# Patient Record
Sex: FEMALE | Race: WHITE | URBAN - METROPOLITAN AREA
[De-identification: names, ages, dates, MRNs, and addresses within clinical notes are randomized per-mention and may not be internally consistent; named-entity substitution may affect disease eponyms.]

---

## 2020-07-07 ENCOUNTER — OFFICE VISIT (OUTPATIENT)
Dept: URBAN - METROPOLITAN AREA CLINIC 113 | Facility: CLINIC | Age: 70
End: 2020-07-07

## 2020-07-23 ENCOUNTER — OFFICE VISIT (OUTPATIENT)
Dept: URBAN - METROPOLITAN AREA CLINIC 113 | Facility: CLINIC | Age: 70
End: 2020-07-23

## 2020-07-25 ENCOUNTER — TELEPHONE ENCOUNTER (OUTPATIENT)
Dept: URBAN - METROPOLITAN AREA CLINIC 13 | Facility: CLINIC | Age: 70
End: 2020-07-25

## 2020-07-25 RX ORDER — SENNOSIDES 8.6 MG
TAKE 1 CAPSULE DAILY WITH A MEAL TABLET ORAL
Refills: 0 | OUTPATIENT
End: 2017-03-01

## 2020-07-25 RX ORDER — DESOXIMETASONE 2.5 MG/G
APPLY INCH CREAM TOPICAL
Refills: 0 | OUTPATIENT
End: 2018-06-12

## 2020-07-25 RX ORDER — MUPIROCIN 20 MG/G
APPLY A SMALL AMOUNT 3 TIMES DAILY AS DIRECTED OINTMENT TOPICAL
Refills: 0 | OUTPATIENT
End: 2019-12-19

## 2020-07-25 RX ORDER — SUMATRIPTAN SUCCINATE 6 MG/.5ML
INJECTION, SOLUTION SUBCUTANEOUS
Qty: 2 | Refills: 0 | OUTPATIENT
Start: 2017-05-04 | End: 2019-03-07

## 2020-07-25 RX ORDER — HYDROCODONE POLISTIREX AND CHLORPHENIRAMINE POLISTIREX 10; 8 MG/5ML; MG/5ML
SUSPENSION, EXTENDED RELEASE ORAL
Qty: 60 | Refills: 0 | OUTPATIENT
Start: 2017-02-13 | End: 2018-02-02

## 2020-07-25 RX ORDER — LIDOCAINE 50 MG/G
APPLY 1 PATCH TO THE AFFECTED AREA AND LEAVE IN PLACE FOR 12 HOURS, THEN REMOVE AND LEAVE OFF FOR 12 HOURS PATCH TOPICAL
Refills: 0 | OUTPATIENT
Start: 2015-09-12 | End: 2017-03-01

## 2020-07-25 RX ORDER — METRONIDAZOLE 500 MG/1
TAKE 1 TABLET EVERY 6 HOURS DAILY TABLET ORAL
Qty: 56 | Refills: 0 | OUTPATIENT
Start: 2020-01-06 | End: 2020-01-23

## 2020-07-25 RX ORDER — COLESEVELAM HYDROCHLORIDE 625 MG/1
TAKE 3 TABLET TWICE DAILY TABLET, FILM COATED ORAL
Qty: 180 | Refills: 2 | OUTPATIENT
Start: 2015-02-09 | End: 2017-10-31

## 2020-07-25 RX ORDER — DIPHENOXYLATE HCL/ATROPINE 2.5-.025MG
TAKE 1 TABLET 4 TIMES DAILY PRN DIARRHEA TABLET ORAL
Qty: 80 | Refills: 1 | OUTPATIENT
End: 2017-03-01

## 2020-07-25 RX ORDER — NAPROXEN SODIUM 220 MG
USE AS DIRECTED TABLET ORAL
Refills: 0 | OUTPATIENT
End: 2020-02-13

## 2020-07-25 RX ORDER — GEMFIBROZIL 600 MG/1
TAKE 1 TABLET TWICE DAILY TABLET, FILM COATED ORAL
Refills: 0 | OUTPATIENT
Start: 2015-02-02 | End: 2017-09-14

## 2020-07-25 RX ORDER — SUMATRIPTAN SUCCINATE 100 MG
TAKE 1 TABLET AT ONSET OF MIGRAINE HEADACHE.  MAY REPEAT IN 2 HOURS IF NEEDED TABLET ORAL
Refills: 0 | OUTPATIENT
End: 2020-02-13

## 2020-07-25 RX ORDER — DIPHENOXYLATE HCL/ATROPINE 2.5-.025MG
TAKE 1 TABLET EVERY 1 TO 2 HOURS AS NEEDED FOR DIARRHEA. MAXIMUM 8 TABLETS PER DAY TABLET ORAL
Refills: 0 | OUTPATIENT
End: 2017-10-31

## 2020-07-25 RX ORDER — GEMFIBROZIL 600 MG/1
TAKE 1 TABLET DAILY TABLET, FILM COATED ORAL
Refills: 0 | OUTPATIENT
End: 2017-10-31

## 2020-07-25 RX ORDER — PANCRELIPASE 36000; 180000; 114000 [USP'U]/1; [USP'U]/1; [USP'U]/1
TAKE 2 CAPSULE BEFORE MEALS CAPSULE, DELAYED RELEASE PELLETS ORAL
Qty: 180 | Refills: 6 | OUTPATIENT
Start: 2016-08-31 | End: 2017-10-31

## 2020-07-25 RX ORDER — ONDANSETRON 8 MG/1
TAKE 1 TABLET TWICE DAILY PRN TABLET ORAL
Qty: 14 | Refills: 0 | OUTPATIENT
Start: 2015-06-16 | End: 2017-03-01

## 2020-07-25 RX ORDER — SACCHAROMYCES BOULARDII 250 MG
TAKE 1 CAPSULE DAILY CAPSULE ORAL
Refills: 0 | OUTPATIENT
End: 2018-02-02

## 2020-07-25 RX ORDER — TRIAMCINOLONE ACETONIDE 1 MG/G
APPLY  AND RUB  IN A THIN FILM TO AFFECTED AREAS TWICE DAILY.(AM AND PM) CREAM TOPICAL
Refills: 0 | OUTPATIENT
End: 2016-06-15

## 2020-07-25 RX ORDER — METHENAMINE HIPPURATE 1 G/1
TAKE 1 TABLET DAILY IN THE EVENING TABLET ORAL
Refills: 0 | OUTPATIENT
End: 2019-05-21

## 2020-07-25 RX ORDER — SERTRALINE 100 MG/1
TAKE 1 TABLET TWICE DAILY TABLET, FILM COATED ORAL
Refills: 0 | OUTPATIENT
Start: 2015-02-02 | End: 2020-04-10

## 2020-07-25 RX ORDER — MIRABEGRON 25 MG/1
TAKE 1 TABLET DAILY TABLET, FILM COATED, EXTENDED RELEASE ORAL
Refills: 0 | OUTPATIENT
End: 2017-03-01

## 2020-07-25 RX ORDER — TRIAMCINOLONE ACETONIDE 1 MG/G
APPLY SPARINGLY TO AFFECTED AREA(S) 2 TO 3 TIMES DAILY CREAM TOPICAL
Refills: 0 | OUTPATIENT
End: 2018-02-02

## 2020-07-25 RX ORDER — EZETIMIBE 10 MG/1
TAKE 1 TABLET DAILY TABLET ORAL
Refills: 0 | OUTPATIENT
End: 2017-01-05

## 2020-07-25 RX ORDER — CALCIUM CARBONATE/VITAMIN D3 500-10/5ML
TAKE 1 CAPSULE DAILY LIQUID (ML) ORAL
Refills: 0 | OUTPATIENT
End: 2017-03-01

## 2020-07-25 RX ORDER — CELECOXIB 200 MG/1
TAKE 1 CAPSULE DAILY WITH A MEAL CAPSULE ORAL
Refills: 0 | OUTPATIENT
Start: 2015-10-25 | End: 2017-10-31

## 2020-07-25 RX ORDER — MOMETASONE FUROATE 1 MG/G
APPLY SPARINGLY TO AFFECTED AREAS TWICE DAILY.(AM AND PM) CREAM TOPICAL
Qty: 1 | Refills: 1 | OUTPATIENT
Start: 2017-01-05 | End: 2017-03-01

## 2020-07-25 RX ORDER — CHOLESTYRAMINE 4 G/9G
MIX THE CONTENTS OF 1 POWDER PACKET WITH 2-6 OZ OF NONCARBONATED BEVERAGE AND SWALLOW TWICE DAILY POWDER, FOR SUSPENSION ORAL
Qty: 60 | Refills: 5 | OUTPATIENT
Start: 2019-06-17 | End: 2019-12-19

## 2020-07-25 RX ORDER — IMMUNE GLOB/PLASMA FRA BOVINE 5 G
TAKE 1 PACKET DAILY POWDER IN PACKET (EA) ORAL
Refills: 0 | OUTPATIENT
End: 2017-10-31

## 2020-07-25 RX ORDER — IMMUNE GLOB/PLASMA FRA BOVINE 5 G
TAKE 1 PACKET TWICE DAILY FOR 2 WEEKS, THEN DECREASE TO ONE PACKET DAILY POWDER IN PACKET (EA) ORAL
Qty: 60 | Refills: 5 | OUTPATIENT
Start: 2016-06-15 | End: 2016-09-15

## 2020-07-25 RX ORDER — METHENAMINE HIPPURATE 1 G/1
TAKE 1 TABLET TWICE DAILY TABLET ORAL
Refills: 0 | OUTPATIENT
End: 2020-04-10

## 2020-07-25 RX ORDER — SULFAMETHOXAZOLE AND TRIMETHOPRIM 800; 160 MG/1; MG/1
TABLET ORAL
Qty: 20 | Refills: 0 | OUTPATIENT
Start: 2017-10-23 | End: 2018-02-02

## 2020-07-25 RX ORDER — DICYCLOMINE HYDROCHLORIDE 10 MG/1
TAKE 1 CAPSULE AS NEEDED FOR ABDOMINAL PAIN UP TO 4 TIMES DAILY CAPSULE ORAL
Qty: 60 | Refills: 6 | OUTPATIENT
Start: 2016-03-14 | End: 2017-03-01

## 2020-07-25 RX ORDER — TOPIRAMATE 50 MG/1
TAKE 1 CAPSULE BEDTIME CAPSULE, EXTENDED RELEASE ORAL
Refills: 0 | OUTPATIENT
End: 2020-04-10

## 2020-07-25 RX ORDER — METRONIDAZOLE 500 MG/1
TAKE 1 TABLET 3 TIMES A DAY TABLET ORAL
Qty: 15 | Refills: 0 | OUTPATIENT
Start: 2016-06-28 | End: 2016-09-15

## 2020-07-25 RX ORDER — CIPROFLOXACIN HYDROCHLORIDE 500 MG/1
TAKE 1 TABLET TWICE DAILY TABLET, FILM COATED ORAL
Qty: 28 | Refills: 0 | OUTPATIENT
Start: 2020-01-06 | End: 2020-01-23

## 2020-07-25 RX ORDER — BIFIDOBACTERIUM LONGUM 10MM CELL
TAKE 1 CAPSULE DAILY CAPSULE ORAL
Refills: 0 | OUTPATIENT
End: 2016-09-15

## 2020-07-25 RX ORDER — DICLOFENAC SODIUM 100 MG/1
TABLET, EXTENDED RELEASE ORAL
Qty: 30 | Refills: 0 | OUTPATIENT
Start: 2017-07-10 | End: 2017-09-14

## 2020-07-25 RX ORDER — RIFAXIMIN 550 MG/1
TAKE 1 TABLET 3 TIMES DAILY TABLET ORAL
Qty: 42 | Refills: 2 | OUTPATIENT
Start: 2019-05-21 | End: 2019-12-19

## 2020-07-25 RX ORDER — GABAPENTIN 800 MG/1
TAKE 1 TABLET 3 TIMES DAILY TABLET, FILM COATED ORAL
Refills: 0 | OUTPATIENT
Start: 2015-02-18 | End: 2017-10-31

## 2020-07-25 RX ORDER — PRENATAL VIT 49/IRON FUM/FOLIC 6.75-0.2MG
TAKE 1 TABLET DAILY TABLET ORAL
Refills: 0 | OUTPATIENT
End: 2020-04-13

## 2020-07-25 RX ORDER — ALBUTEROL SULFATE 90 UG/1
INHALE 1 TO 2 PUFFS EVERY 4 TO 6 HOURS AS NEEDED AEROSOL, METERED RESPIRATORY (INHALATION)
Refills: 0 | OUTPATIENT
End: 2018-02-02

## 2020-07-25 RX ORDER — MONTELUKAST SODIUM 10 MG/1
TAKE 1 TABLET AT BEDTIME TABLET, FILM COATED ORAL
Refills: 0 | OUTPATIENT
End: 2017-10-31

## 2020-07-25 RX ORDER — MOMETASONE FUROATE 1 MG/G
APPLY 0.75 GM TWICE DAILY PRN CREAM TOPICAL
Qty: 45 | Refills: 0 | OUTPATIENT
Start: 2017-10-31 | End: 2018-02-02

## 2020-07-25 RX ORDER — POLYETHYLENE GLYCOL 3350, SODIUM CHLORIDE, SODIUM BICARBONATE AND POTASSIUM CHLORIDE WITH LEMON FLAVOR 420; 11.2; 5.72; 1.48 G/4L; G/4L; G/4L; G/4L
TAKE 1/2 GALLON AT 5:00 PM DAY BEFORE PROCEDURE, TAKE SECOND 1/2 OF GALLON 6 HRS PRIOR TO PROCEDURE POWDER, FOR SOLUTION ORAL
Qty: 1 | Refills: 0 | OUTPATIENT
Start: 2020-04-14 | End: 2020-04-28

## 2020-07-25 RX ORDER — CALCIUM CARB/VIT D2/MINERALS
TAKE 1 TABLET DAILY - PT STATES UNKNOWN DOSE TABLET ORAL
Refills: 0 | OUTPATIENT
End: 2017-03-01

## 2020-07-25 RX ORDER — POLYETHYLENE GLYCOL 3350, SODIUM CHLORIDE, SODIUM BICARBONATE AND POTASSIUM CHLORIDE WITH LEMON FLAVOR 420; 11.2; 5.72; 1.48 G/4L; G/4L; G/4L; G/4L
DRINK 32 OUNCES AT 5PM DAY BEFORE PROCEDURE AND 6 HOURS PRIOR POWDER, FOR SOLUTION ORAL
Qty: 1 | Refills: 0 | OUTPATIENT
Start: 2016-02-04 | End: 2016-02-11

## 2020-07-25 RX ORDER — MECLIZINE HYDROCHLORIDE 12.5 MG/1
TAKE 1-2 TABLETS TWICE DAILY TABLET ORAL
Refills: 0 | OUTPATIENT
End: 2017-10-31

## 2020-07-25 RX ORDER — VITAM B12 100 MCG
TAKE 1 TABLET DAILY AS DIRECTED TAB ORAL
Refills: 0 | OUTPATIENT
End: 2016-02-11

## 2020-07-25 RX ORDER — OMEPRAZOLE 40 MG/1
TAKE 1 CAPSULE BY MOUTH 30 MINUTES BEFORE BREAKFAST CAPSULE, DELAYED RELEASE ORAL
Qty: 30 | Refills: 11 | OUTPATIENT
Start: 2016-02-02 | End: 2017-03-01

## 2020-07-25 RX ORDER — THIAMINE MONONITRATE (VIT B1) 100 MG
TAKE 1 TABLET DAILY TABLET ORAL
Refills: 0 | OUTPATIENT
End: 2017-10-31

## 2020-07-25 RX ORDER — TOPIRAMATE 50 MG/1
TAKE 1 TABLET DAILY TABLET, FILM COATED ORAL
Refills: 0 | OUTPATIENT
End: 2018-02-02

## 2020-07-25 RX ORDER — DICYCLOMINE HYDROCHLORIDE 10 MG/1
TAKE 1 CAPSULE EVERY 6 HOURS PRN ABDOMINAL PAIN CAPSULE ORAL
Qty: 40 | Refills: 1 | OUTPATIENT
Start: 2018-06-12 | End: 2019-03-07

## 2020-07-25 RX ORDER — LEVOTHYROXINE SODIUM 125 UG/1
TAKE 1 TABLET DAILY TABLET ORAL
Refills: 0 | OUTPATIENT
Start: 2015-07-13 | End: 2017-01-05

## 2020-07-25 RX ORDER — TAMSULOSIN HYDROCHLORIDE 0.4 MG/1
CAPSULE ORAL
Qty: 30 | Refills: 0 | OUTPATIENT
Start: 2020-01-10 | End: 2020-02-13

## 2020-07-25 RX ORDER — FAMOTIDINE 40 MG/1
TAKE 1 TABLET BEDTIME TABLET ORAL
Qty: 30 | Refills: 11 | OUTPATIENT
Start: 2015-03-09 | End: 2017-03-01

## 2020-07-26 ENCOUNTER — TELEPHONE ENCOUNTER (OUTPATIENT)
Dept: URBAN - METROPOLITAN AREA CLINIC 13 | Facility: CLINIC | Age: 70
End: 2020-07-26

## 2020-07-26 RX ORDER — HYDROCODONE BITARTRATE AND ACETAMINOPHEN 7.5; 325 MG/1; MG/1
TABLET ORAL
Qty: 15 | Refills: 0 | Status: ACTIVE | COMMUNITY
Start: 2016-06-29

## 2020-07-26 RX ORDER — SUMATRIPTAN SUCCINATE 50 MG/1
TABLET, FILM COATED ORAL
Qty: 9 | Refills: 0 | Status: ACTIVE | COMMUNITY
Start: 2016-02-22

## 2020-07-26 RX ORDER — NITROFURANTOIN MONOHYDRATE/MACROCRYSTALLINE 25; 75 MG/1; MG/1
CAPSULE ORAL
Qty: 14 | Refills: 0 | Status: ACTIVE | COMMUNITY
Start: 2016-07-26

## 2020-07-26 RX ORDER — ACETAMINOPHEN 500 MG/1
TAKE 2 TABLET TWICE DAILY TABLET, FILM COATED ORAL
Refills: 0 | Status: ACTIVE | COMMUNITY

## 2020-07-26 RX ORDER — CIPROFLOXACIN HYDROCHLORIDE 250 MG/1
TABLET, FILM COATED ORAL
Qty: 14 | Refills: 0 | Status: ACTIVE | COMMUNITY
Start: 2016-08-02

## 2020-07-26 RX ORDER — CLARITHROMYCIN 500 MG/1
TABLET, FILM COATED ORAL
Qty: 20 | Refills: 0 | Status: ACTIVE | COMMUNITY
Start: 2018-03-14

## 2020-07-26 RX ORDER — FEXOFENADINE HYDROCHLORIDE 180 MG/1
TAKE 1 TABLET EVERY MORNING TABLET ORAL
Qty: 30 | Refills: 0 | Status: ACTIVE | COMMUNITY
Start: 2018-10-12

## 2020-07-26 RX ORDER — MONTELUKAST SODIUM 10 MG/1
TABLET, FILM COATED ORAL
Qty: 30 | Refills: 0 | Status: ACTIVE | COMMUNITY
Start: 2017-02-13

## 2020-07-26 RX ORDER — SUMATRIPTAN SUCCINATE 50 MG/1
TABLET, FILM COATED ORAL
Qty: 9 | Refills: 0 | Status: ACTIVE | COMMUNITY
Start: 2016-09-12

## 2020-07-26 RX ORDER — METFORMIN HYDROCHLORIDE 500 MG/1
TABLET, COATED ORAL
Qty: 30 | Refills: 0 | Status: ACTIVE | COMMUNITY
Start: 2016-01-26

## 2020-07-26 RX ORDER — NITROFURANTOIN 50 MG/1
CAPSULE ORAL
Qty: 30 | Refills: 0 | Status: ACTIVE | COMMUNITY
Start: 2015-08-31

## 2020-07-26 RX ORDER — LEVOTHYROXINE SODIUM 0.14 MG/1
TABLET ORAL
Qty: 30 | Refills: 0 | Status: ACTIVE | COMMUNITY
Start: 2016-02-23

## 2020-07-26 RX ORDER — FEXOFENADINE HCL 180 MG/1
TAKE 1 TABLET EVERY MORNING TABLET, FILM COATED ORAL
Qty: 30 | Refills: 0 | Status: ACTIVE | COMMUNITY
Start: 2019-05-10

## 2020-07-26 RX ORDER — TOPIRAMATE 100 MG/1
TAKE 1 CAPSULE EVERY DAY CAPSULE, EXTENDED RELEASE ORAL
Qty: 30 | Refills: 0 | Status: ACTIVE | COMMUNITY
Start: 2018-09-14

## 2020-07-26 RX ORDER — SUMATRIPTAN SUCCINATE 50 MG/1
TABLET, FILM COATED ORAL
Qty: 9 | Refills: 0 | Status: ACTIVE | COMMUNITY
Start: 2015-07-31

## 2020-07-26 RX ORDER — FAMOTIDINE 40 MG/1
TAKE 1 TABLET AT BEDTIME TABLET ORAL
Refills: 0 | Status: ACTIVE | COMMUNITY

## 2020-07-26 RX ORDER — FEXOFENADINE HYDROCHLORIDE 180 MG/1
TAKE 1 TABLET EVERY MORNING TABLET ORAL
Qty: 30 | Refills: 0 | Status: ACTIVE | COMMUNITY
Start: 2019-05-10

## 2020-07-26 RX ORDER — TAMSULOSIN HYDROCHLORIDE 0.4 MG/1
TAKE 1 CAPSULE DAILY CAPSULE ORAL
Refills: 0 | Status: ACTIVE | COMMUNITY

## 2020-07-26 RX ORDER — POLYMYXIN B SULFATE AND TRIMETHOPRIM SULFATE 10000; 1 [USP'U]/ML; MG/ML
SOLUTION/ DROPS OPHTHALMIC
Qty: 10 | Refills: 0 | Status: ACTIVE | COMMUNITY
Start: 2018-04-03

## 2020-07-26 RX ORDER — EVOLOCUMAB 140 MG/ML
INJECTION, SOLUTION SUBCUTANEOUS
Qty: 2 | Refills: 0 | Status: ACTIVE | COMMUNITY
Start: 2020-05-15

## 2020-07-26 RX ORDER — HYDROCODONE BITARTRATE AND ACETAMINOPHEN 5; 325 MG/1; MG/1
TABLET ORAL
Qty: 30 | Refills: 0 | Status: ACTIVE | COMMUNITY
Start: 2015-11-05

## 2020-07-26 RX ORDER — SUMATRIPTAN SUCCINATE 50 MG/1
TABLET, FILM COATED ORAL
Qty: 9 | Refills: 0 | Status: ACTIVE | COMMUNITY
Start: 2015-04-13

## 2020-07-26 RX ORDER — TRAZODONE HYDROCHLORIDE 150 MG/1
TABLET ORAL
Qty: 30 | Refills: 0 | Status: ACTIVE | COMMUNITY
Start: 2018-10-12

## 2020-07-26 RX ORDER — ONDANSETRON 8 MG/1
TABLET ORAL
Qty: 14 | Refills: 0 | Status: ACTIVE | COMMUNITY
Start: 2015-06-16

## 2020-07-26 RX ORDER — WHEAT DEXTRIN 3 G/4 G
TAKE 1 TBSP DAILY PT UNSURE OF DOSAGE POWDER (GRAM) ORAL
Refills: 0 | Status: ACTIVE | COMMUNITY

## 2020-07-26 RX ORDER — ESTRADIOL 10 UG/1
INSERT VAGINAL
Qty: 18 | Refills: 0 | Status: ACTIVE | COMMUNITY
Start: 2016-04-19

## 2020-07-26 RX ORDER — LEVOTHYROXINE SODIUM 0.14 MG/1
TABLET ORAL
Qty: 30 | Refills: 0 | Status: ACTIVE | COMMUNITY
Start: 2018-06-18

## 2020-07-26 RX ORDER — TRIAMTERENE AND HYDROCHLOROTHIAZIDE 37.5; 25 MG/1; MG/1
CAPSULE ORAL
Qty: 30 | Refills: 0 | Status: ACTIVE | COMMUNITY
Start: 2015-06-30

## 2020-07-26 RX ORDER — TIZANIDINE 4 MG/1
TABLET ORAL
Qty: 30 | Refills: 0 | Status: ACTIVE | COMMUNITY
Start: 2019-05-16

## 2020-07-26 RX ORDER — TOPIRAMATE 100 MG/1
CAPSULE, EXTENDED RELEASE ORAL
Qty: 30 | Refills: 0 | Status: ACTIVE | COMMUNITY
Start: 2019-03-11

## 2020-07-26 RX ORDER — TRIAMTERENE AND HYDROCHLOROTHIAZIDE 37.5; 25 MG/1; MG/1
CAPSULE ORAL
Qty: 30 | Refills: 0 | Status: ACTIVE | COMMUNITY
Start: 2015-11-30

## 2020-07-26 RX ORDER — TRAZODONE HYDROCHLORIDE 100 MG/1
TAKE 1.5 TABLET BEDTIME TABLET, FILM COATED ORAL
Qty: 45 | Refills: 1 | Status: ACTIVE | COMMUNITY
Start: 2015-02-02

## 2020-07-26 RX ORDER — GABAPENTIN 100 MG/1
CAPSULE ORAL
Qty: 30 | Refills: 0 | Status: ACTIVE | COMMUNITY
Start: 2018-09-29

## 2020-07-26 RX ORDER — ESTRADIOL 0.1 MG/G
USE VAGINALLY AS DIRECTED TWICE A WEEK CREAM VAGINAL
Qty: 42 | Refills: 0 | Status: ACTIVE | COMMUNITY
Start: 2019-07-25

## 2020-07-26 RX ORDER — LEVOTHYROXINE SODIUM 0.14 MG/1
TABLET ORAL
Qty: 14 | Refills: 0 | Status: ACTIVE | COMMUNITY
Start: 2016-02-10

## 2020-07-26 RX ORDER — TRAZODONE HYDROCHLORIDE 150 MG/1
TABLET ORAL
Qty: 30 | Refills: 0 | Status: ACTIVE | COMMUNITY
Start: 2019-03-14

## 2020-07-26 RX ORDER — METFORMIN HYDROCHLORIDE 500 MG/1
TABLET, COATED ORAL
Qty: 30 | Refills: 0 | Status: ACTIVE | COMMUNITY
Start: 2015-12-28

## 2020-07-26 RX ORDER — EZETIMIBE 10 MG/1
TABLET ORAL
Qty: 30 | Refills: 0 | Status: ACTIVE | COMMUNITY
Start: 2016-05-18

## 2020-07-26 RX ORDER — SERTRALINE 100 MG/1
TAKE 1 TABLET DAILY AS DIRECTED TABLET, FILM COATED ORAL
Refills: 0 | Status: ACTIVE | COMMUNITY

## 2020-07-26 RX ORDER — VALACYCLOVIR 1 G/1
TABLET, FILM COATED ORAL
Qty: 30 | Refills: 0 | Status: ACTIVE | COMMUNITY
Start: 2019-03-25

## 2020-07-26 RX ORDER — SUMATRIPTAN SUCCINATE 6 MG/.5ML
INJECTION, SOLUTION SUBCUTANEOUS
Qty: 2 | Refills: 0 | Status: ACTIVE | COMMUNITY
Start: 2017-05-04

## 2020-07-26 RX ORDER — ESTRADIOL 0.1 MG/G
CREAM VAGINAL
Qty: 42 | Refills: 0 | Status: ACTIVE | COMMUNITY
Start: 2019-07-26

## 2020-07-26 RX ORDER — NITROFURANTOIN MONOHYDRATE/MACROCRYSTALLINE 25; 75 MG/1; MG/1
CAPSULE ORAL
Qty: 20 | Refills: 0 | Status: ACTIVE | COMMUNITY
Start: 2019-07-26

## 2020-07-26 RX ORDER — CHOLESTYRAMINE 4 G/9G
MIX 1 SCOOP IN LIQUID AND DRINK ONCE DAILY POWDER, FOR SUSPENSION ORAL
Refills: 0 | Status: ACTIVE | COMMUNITY

## 2020-07-26 RX ORDER — PANCRELIPASE 60000; 12000; 38000 [USP'U]/1; [USP'U]/1; [USP'U]/1
CAPSULE, DELAYED RELEASE PELLETS ORAL
Qty: 100 | Refills: 0 | Status: ACTIVE | COMMUNITY
Start: 2016-09-30

## 2020-07-26 RX ORDER — ALPRAZOLAM 1 MG/1
TAKE 1 TABLET 4 TIMES DAILY TABLET ORAL
Refills: 0 | Status: ACTIVE | COMMUNITY
Start: 2015-02-02

## 2020-07-26 RX ORDER — CIPROFLOXACIN HYDROCHLORIDE 500 MG/1
TABLET, FILM COATED ORAL
Qty: 14 | Refills: 0 | Status: ACTIVE | COMMUNITY
Start: 2019-10-05

## 2020-07-26 RX ORDER — PANCRELIPASE 60000; 12000; 38000 [USP'U]/1; [USP'U]/1; [USP'U]/1
CAPSULE, DELAYED RELEASE PELLETS ORAL
Qty: 100 | Refills: 0 | Status: ACTIVE | COMMUNITY
Start: 2016-11-02

## 2020-07-26 RX ORDER — METFORMIN HYDROCHLORIDE 500 MG/1
TABLET, COATED ORAL
Qty: 30 | Refills: 0 | Status: ACTIVE | COMMUNITY
Start: 2015-06-30

## 2020-07-26 RX ORDER — TRAMADOL HYDROCHLORIDE 50 MG/1
TAKE 1 OR 2 TABLETS TWICE DAILY AS NEEDED FOR PAIN TABLET ORAL
Qty: 28 | Refills: 0 | Status: ACTIVE | COMMUNITY
Start: 2018-09-29

## 2020-07-26 RX ORDER — SULFAMETHOXAZOLE AND TRIMETHOPRIM 800; 160 MG/1; MG/1
TABLET ORAL
Qty: 20 | Refills: 0 | Status: ACTIVE | COMMUNITY
Start: 2015-02-17

## 2020-07-26 RX ORDER — EVOLOCUMAB 140 MG/ML
INJECTION, SOLUTION SUBCUTANEOUS
Qty: 2 | Refills: 0 | Status: ACTIVE | COMMUNITY
Start: 2020-07-13

## 2020-07-26 RX ORDER — TRAZODONE HYDROCHLORIDE 150 MG/1
TABLET ORAL
Qty: 30 | Refills: 0 | Status: ACTIVE | COMMUNITY
Start: 2016-07-01

## 2020-07-26 RX ORDER — AZELASTINE HYDROCHLORIDE 137 UG/1
SPRAY, METERED NASAL
Qty: 30 | Refills: 0 | Status: ACTIVE | COMMUNITY
Start: 2018-04-13

## 2020-07-26 RX ORDER — TRAMADOL HYDROCHLORIDE 50 MG/1
TABLET ORAL
Qty: 20 | Refills: 0 | Status: ACTIVE | COMMUNITY
Start: 2018-08-28

## 2020-07-26 RX ORDER — NITROFURANTOIN MONOHYDRATE/MACROCRYSTALLINE 25; 75 MG/1; MG/1
TAKE ONE CAPSULE BY MOUTH TWICE DAILY UNTIL FINISHED CAPSULE ORAL
Qty: 14 | Refills: 0 | Status: ACTIVE | COMMUNITY
Start: 2019-10-16

## 2020-07-26 RX ORDER — VANCOMYCIN HYDROCHLORIDE 125 MG/1
TAKE 1 CAPSULE 4 TIMES DAILY CAPSULE ORAL
Qty: 56 | Refills: 0 | Status: ACTIVE | COMMUNITY
Start: 2020-02-13

## 2020-07-26 RX ORDER — MIRABEGRON 25 MG/1
TABLET, FILM COATED, EXTENDED RELEASE ORAL
Qty: 30 | Refills: 0 | Status: ACTIVE | COMMUNITY
Start: 2015-03-03

## 2020-07-26 RX ORDER — SULFAMETHOXAZOLE AND TRIMETHOPRIM 400; 80 MG/1; MG/1
TABLET ORAL
Qty: 30 | Refills: 0 | Status: ACTIVE | COMMUNITY
Start: 2015-08-31

## 2020-07-26 RX ORDER — TRAZODONE HYDROCHLORIDE 150 MG/1
TABLET ORAL
Qty: 30 | Refills: 0 | Status: ACTIVE | COMMUNITY
Start: 2016-12-02

## 2020-07-26 RX ORDER — GABAPENTIN 100 MG/1
TAKE 1 CAPSULE AT BEDTIME CAPSULE ORAL
Qty: 30 | Refills: 4 | Status: ACTIVE | COMMUNITY
Start: 2018-12-20

## 2020-07-26 RX ORDER — ESTRADIOL 0.1 MG/G
CREAM VAGINAL
Qty: 42 | Refills: 0 | Status: ACTIVE | COMMUNITY
Start: 2018-08-07

## 2020-07-26 RX ORDER — TRAZODONE HYDROCHLORIDE 150 MG/1
TABLET ORAL
Qty: 30 | Refills: 0 | Status: ACTIVE | COMMUNITY
Start: 2015-10-29

## 2020-07-26 RX ORDER — GABAPENTIN 100 MG/1
TAKE 1 CAPSULE AT BEDTIME CAPSULE ORAL
Qty: 30 | Refills: 0 | Status: ACTIVE | COMMUNITY
Start: 2018-09-12

## 2020-07-26 RX ORDER — ONDANSETRON HYDROCHLORIDE 4 MG/1
TABLET, FILM COATED ORAL
Qty: 10 | Refills: 0 | Status: ACTIVE | COMMUNITY
Start: 2018-10-15

## 2020-07-26 RX ORDER — ESTRADIOL 10 UG/1
INSERT ONE VAGINALLY TWICE A WEEK AT BEDTIME AS DIRECTED TABLET, FILM COATED VAGINAL
Qty: 8 | Refills: 0 | Status: ACTIVE | COMMUNITY
Start: 2019-01-18

## 2020-07-26 RX ORDER — TRAZODONE HYDROCHLORIDE 150 MG/1
TABLET ORAL
Qty: 30 | Refills: 0 | Status: ACTIVE | COMMUNITY
Start: 2016-03-23

## 2020-07-26 RX ORDER — NITROFURANTOIN MONOHYDRATE/MACROCRYSTALLINE 25; 75 MG/1; MG/1
CAPSULE ORAL
Qty: 20 | Refills: 0 | Status: ACTIVE | COMMUNITY
Start: 2018-10-26

## 2020-07-26 RX ORDER — METFORMIN HYDROCHLORIDE 500 MG/1
TABLET, COATED ORAL
Qty: 30 | Refills: 0 | Status: ACTIVE | COMMUNITY
Start: 2015-10-29

## 2020-07-26 RX ORDER — SUCRALFATE 1 G/1
TABLET ORAL
Qty: 60 | Refills: 0 | Status: ACTIVE | COMMUNITY
Start: 2015-04-03

## 2020-07-26 RX ORDER — FEXOFENADINE HYDROCHLORIDE 180 MG/1
TAKE 1 TABLET EVERY MORNING TABLET ORAL
Qty: 30 | Refills: 0 | Status: ACTIVE | COMMUNITY
Start: 2019-03-14

## 2020-07-26 RX ORDER — SUMATRIPTAN SUCCINATE 6 MG/.5ML
INJECTION, SOLUTION SUBCUTANEOUS
Qty: 1 | Refills: 0 | Status: ACTIVE | COMMUNITY
Start: 2015-04-13

## 2020-07-26 RX ORDER — CARISOPRODOL 350 MG/1
TABLET ORAL
Qty: 90 | Refills: 0 | Status: ACTIVE | COMMUNITY
Start: 2015-04-13

## 2020-07-26 RX ORDER — TOPIRAMATE 100 MG/1
CAPSULE, EXTENDED RELEASE ORAL
Qty: 30 | Refills: 0 | Status: ACTIVE | COMMUNITY
Start: 2018-07-19

## 2020-07-26 RX ORDER — DICYCLOMINE HYDROCHLORIDE 10 MG/1
TAKE 1 CAPSULE BY MOUTH EVERY 4 TO 6 HOURS AS NEEDED FOR ABDOMINAL PAIN CAPSULE ORAL
Qty: 60 | Refills: 2 | Status: ACTIVE | COMMUNITY
Start: 2020-04-13

## 2020-07-26 RX ORDER — DIPHENOXYLATE HYDROCHLORIDE AND ATROPINE SULFATE 2.5; .025 MG/1; MG/1
TABLET ORAL
Qty: 20 | Refills: 0 | Status: ACTIVE | COMMUNITY
Start: 2015-11-10

## 2020-07-26 RX ORDER — ALBUTEROL SULFATE 90 UG/1
AEROSOL, METERED RESPIRATORY (INHALATION)
Qty: 18 | Refills: 0 | Status: ACTIVE | COMMUNITY
Start: 2017-07-10

## 2020-07-26 RX ORDER — SUMATRIPTAN SUCCINATE 50 MG/1
TABLET, FILM COATED ORAL
Qty: 9 | Refills: 0 | Status: ACTIVE | COMMUNITY
Start: 2017-01-04

## 2020-07-26 RX ORDER — METRONIDAZOLE 500 MG/1
TABLET ORAL
Qty: 42 | Refills: 0 | Status: ACTIVE | COMMUNITY
Start: 2015-11-10

## 2020-07-26 RX ORDER — SERTRALINE HYDROCHLORIDE 112 UG/1
TAKE ONE TABLET BY MOUTH EVERY DAY TABLET ORAL
Qty: 30 | Refills: 0 | Status: ACTIVE | COMMUNITY
Start: 2018-12-19

## 2020-07-26 RX ORDER — CHOLESTYRAMINE 4 G/5.5G
POWDER, FOR SUSPENSION ORAL
Qty: 60 | Refills: 0 | Status: ACTIVE | COMMUNITY
Start: 2015-11-18

## 2020-07-26 RX ORDER — TRAMADOL HYDROCHLORIDE 50 MG/1
TAKE ONE TABLET BY MOUTH TWICE DAILY TABLET ORAL
Qty: 60 | Refills: 0 | Status: ACTIVE | COMMUNITY
Start: 2020-03-10

## 2020-07-26 RX ORDER — TRAZODONE HYDROCHLORIDE 150 MG/1
TABLET ORAL
Qty: 30 | Refills: 0 | Status: ACTIVE | COMMUNITY
Start: 2018-06-20

## 2020-07-26 RX ORDER — METFORMIN HYDROCHLORIDE 500 MG/1
TABLET, COATED ORAL
Qty: 30 | Refills: 0 | Status: ACTIVE | COMMUNITY
Start: 2015-03-02

## 2020-07-26 RX ORDER — METHYLPREDNISOLONE 4 MG/1
TABLET ORAL
Qty: 21 | Refills: 0 | Status: ACTIVE | COMMUNITY
Start: 2019-02-25

## 2020-07-26 RX ORDER — CIPROFLOXACIN HYDROCHLORIDE 500 MG/1
TABLET, FILM COATED ORAL
Qty: 14 | Refills: 0 | Status: ACTIVE | COMMUNITY
Start: 2018-07-19

## 2020-07-26 RX ORDER — FLUCONAZOLE 150 MG/1
TABLET ORAL
Qty: 1 | Refills: 0 | Status: ACTIVE | COMMUNITY
Start: 2019-03-21

## 2020-07-26 RX ORDER — METFORMIN HYDROCHLORIDE 500 MG/1
TABLET, COATED ORAL
Qty: 30 | Refills: 0 | Status: ACTIVE | COMMUNITY
Start: 2015-02-02

## 2020-07-26 RX ORDER — MECLIZINE HYDROCHLORIDE 12.5 MG/1
TAKE 1-2 TABLETS DAILY AS DIRECTED TABLET ORAL
Refills: 0 | Status: ACTIVE | COMMUNITY

## 2020-07-26 RX ORDER — FAMOTIDINE 40 MG/1
TAKE 1 TABLET BEDTIME TABLET, FILM COATED ORAL
Refills: 0 | Status: ACTIVE | COMMUNITY

## 2020-07-26 RX ORDER — LEVOTHYROXINE SODIUM 0.1 MG/1
TABLET ORAL
Qty: 30 | Refills: 0 | Status: ACTIVE | COMMUNITY
Start: 2015-07-02

## 2020-07-26 RX ORDER — METHYLPREDNISOLONE 4 MG/1
TABLET ORAL
Qty: 21 | Refills: 0 | Status: ACTIVE | COMMUNITY
Start: 2017-10-23

## 2020-07-26 RX ORDER — VANCOMYCIN HYDROCHLORIDE 250 MG/1
TAKE 1 CAPSULE 4 TIMES DAILY CAPSULE ORAL
Qty: 56 | Refills: 0 | Status: ACTIVE | COMMUNITY
Start: 2020-07-08

## 2020-07-26 RX ORDER — MIRABEGRON 25 MG/1
TABLET, FILM COATED, EXTENDED RELEASE ORAL
Qty: 30 | Refills: 0 | Status: ACTIVE | COMMUNITY
Start: 2015-06-30

## 2020-07-26 RX ORDER — IBUPROFEN 800 MG/1
TABLET ORAL
Qty: 20 | Refills: 0 | Status: ACTIVE | COMMUNITY
Start: 2017-08-05

## 2020-07-26 RX ORDER — GABAPENTIN 100 MG/1
CAPSULE ORAL
Qty: 30 | Refills: 0 | Status: ACTIVE | COMMUNITY
Start: 2018-08-30

## 2020-07-26 RX ORDER — LEVOTHYROXINE SODIUM 0.14 MG/1
TABLET ORAL
Qty: 30 | Refills: 0 | Status: ACTIVE | COMMUNITY
Start: 2016-03-23

## 2020-07-26 RX ORDER — SUMATRIPTAN SUCCINATE 6 MG/.5ML
INJECT 1 ML OTHER PRN INJECTION, SOLUTION SUBCUTANEOUS
Refills: 0 | Status: ACTIVE | COMMUNITY
Start: 2019-08-08

## 2020-07-26 RX ORDER — MECLIZINE HCL 25 MG/1
TAKE ONE-HALF TO ONE TABLET BY MOUTH TWICE DAILY TABLET, CHEWABLE ORAL
Qty: 20 | Refills: 0 | Status: ACTIVE | COMMUNITY
Start: 2019-02-22

## 2020-07-26 RX ORDER — LEVOTHYROXINE SODIUM 0.1 MG/1
TABLET ORAL
Qty: 30 | Refills: 0 | Status: ACTIVE | COMMUNITY
Start: 2015-03-03

## 2020-07-26 RX ORDER — CIPROFLOXACIN HYDROCHLORIDE 500 MG/1
TABLET, FILM COATED ORAL
Qty: 14 | Refills: 0 | Status: ACTIVE | COMMUNITY
Start: 2016-02-02

## 2020-07-26 RX ORDER — SULFAMETHOXAZOLE AND TRIMETHOPRIM 800; 160 MG/1; MG/1
TABLET ORAL
Qty: 20 | Refills: 0 | Status: ACTIVE | COMMUNITY
Start: 2016-01-26

## 2020-07-26 RX ORDER — TRIAMTERENE AND HYDROCHLOROTHIAZIDE 37.5; 25 MG/1; MG/1
TABLET ORAL
Qty: 30 | Refills: 0 | Status: ACTIVE | COMMUNITY
Start: 2015-02-02

## 2020-07-26 RX ORDER — TRAZODONE HYDROCHLORIDE 150 MG/1
TABLET ORAL
Qty: 30 | Refills: 0 | Status: ACTIVE | COMMUNITY
Start: 2018-09-14

## 2020-07-26 RX ORDER — OXYCODONE AND ACETAMINOPHEN 5; 325 MG/1; MG/1
TABLET ORAL
Qty: 40 | Refills: 0 | Status: ACTIVE | COMMUNITY
Start: 2015-04-02

## 2020-07-26 RX ORDER — HYDROCODONE BITARTRATE AND ACETAMINOPHEN 7.5; 325 MG/1; MG/1
TABLET ORAL
Qty: 15 | Refills: 0 | Status: ACTIVE | COMMUNITY
Start: 2017-03-10

## 2020-07-26 RX ORDER — NORTRIPTYLINE HYDROCHLORIDE 10 MG/1
CAPSULE ORAL
Qty: 30 | Refills: 0 | Status: ACTIVE | COMMUNITY
Start: 2015-02-02

## 2020-07-26 RX ORDER — PRAZOSIN HYDROCHLORIDE 1 MG/1
CAPSULE ORAL
Qty: 60 | Refills: 0 | Status: ACTIVE | COMMUNITY
Start: 2015-03-03

## 2020-07-26 RX ORDER — SUCRALFATE 1 G/1
TABLET ORAL
Qty: 60 | Refills: 0 | Status: ACTIVE | COMMUNITY
Start: 2015-03-03

## 2020-07-26 RX ORDER — FLUTICASONE PROPIONATE 50 UG/1
SPRAY, METERED NASAL
Qty: 16 | Refills: 0 | Status: ACTIVE | COMMUNITY
Start: 2016-03-29

## 2020-07-26 RX ORDER — PREDNISONE 10 MG/1
TABLET ORAL
Qty: 60 | Refills: 0 | Status: ACTIVE | COMMUNITY
Start: 2016-04-04

## 2020-07-26 RX ORDER — TOPIRAMATE 100 MG/1
CAPSULE, EXTENDED RELEASE ORAL
Qty: 30 | Refills: 0 | Status: ACTIVE | COMMUNITY
Start: 2018-10-12

## 2020-07-26 RX ORDER — TRIAMTERENE AND HYDROCHLOROTHIAZIDE 37.5; 25 MG/1; MG/1
CAPSULE ORAL
Qty: 30 | Refills: 0 | Status: ACTIVE | COMMUNITY
Start: 2015-10-29

## 2020-07-26 RX ORDER — CIPROFLOXACIN HYDROCHLORIDE 500 MG/1
TABLET, FILM COATED ORAL
Qty: 20 | Refills: 0 | Status: ACTIVE | COMMUNITY
Start: 2015-07-13

## 2020-07-26 RX ORDER — MIRABEGRON 25 MG/1
TABLET, FILM COATED, EXTENDED RELEASE ORAL
Qty: 30 | Refills: 0 | Status: ACTIVE | COMMUNITY
Start: 2016-01-26

## 2020-07-26 RX ORDER — DOXYCYCLINE 100 MG/1
CAPSULE ORAL
Qty: 20 | Refills: 0 | Status: ACTIVE | COMMUNITY
Start: 2019-03-21

## 2020-07-26 RX ORDER — TOPIRAMATE 100 MG/1
TAKE 1 CAPSULE BEDTIME CAPSULE, EXTENDED RELEASE ORAL
Refills: 0 | Status: ACTIVE | COMMUNITY

## 2020-07-26 RX ORDER — SUCRALFATE 1 G/1
TABLET ORAL
Qty: 60 | Refills: 0 | Status: ACTIVE | COMMUNITY
Start: 2015-02-02

## 2020-07-26 RX ORDER — LEVOFLOXACIN 500 MG/1
TABLET, FILM COATED ORAL
Qty: 10 | Refills: 0 | Status: ACTIVE | COMMUNITY
Start: 2019-12-02

## 2020-07-26 RX ORDER — METHYLPREDNISOLONE 4 MG/1
TABLET ORAL
Qty: 21 | Refills: 0 | Status: ACTIVE | COMMUNITY
Start: 2016-09-26

## 2020-07-26 RX ORDER — NITROFURANTOIN MONOHYDRATE/MACROCRYSTALLINE 25; 75 MG/1; MG/1
TAKE 1 CAPSULE AFTER INTERCOURSE CAPSULE ORAL
Qty: 20 | Refills: 0 | Status: ACTIVE | COMMUNITY
Start: 2018-08-07

## 2020-07-26 RX ORDER — LEVOTHYROXINE SODIUM 0.14 MG/1
TABLET ORAL
Qty: 30 | Refills: 0 | Status: ACTIVE | COMMUNITY
Start: 2018-08-16

## 2020-07-26 RX ORDER — AZELASTINE HYDROCHLORIDE AND FLUTICASONE PROPIONATE 137; 50 UG/1; UG/1
INSTILL 1 SQUIRT DAILY SPRAY, METERED NASAL
Refills: 0 | Status: ACTIVE | COMMUNITY

## 2020-07-26 RX ORDER — MIRABEGRON 25 MG/1
TABLET, FILM COATED, EXTENDED RELEASE ORAL
Qty: 30 | Refills: 0 | Status: ACTIVE | COMMUNITY
Start: 2015-04-03

## 2020-07-26 RX ORDER — LEVOTHYROXINE SODIUM 0.14 MG/1
TABLET ORAL
Qty: 30 | Refills: 0 | Status: ACTIVE | COMMUNITY
Start: 2018-10-12

## 2020-07-26 RX ORDER — TRAZODONE HYDROCHLORIDE 150 MG/1
TABLET ORAL
Qty: 30 | Refills: 0 | Status: ACTIVE | COMMUNITY
Start: 2018-07-18

## 2020-07-26 RX ORDER — DICLOFENAC SODIUM 10 MG/G
GEL TOPICAL
Qty: 500 | Refills: 0 | Status: ACTIVE | COMMUNITY
Start: 2016-06-08

## 2020-07-26 RX ORDER — PANCRELIPASE 60000; 12000; 38000 [USP'U]/1; [USP'U]/1; [USP'U]/1
CAPSULE, DELAYED RELEASE PELLETS ORAL
Qty: 100 | Refills: 0 | Status: ACTIVE | COMMUNITY
Start: 2016-08-31

## 2020-07-26 RX ORDER — EVOLOCUMAB 140 MG/ML
INJECTION, SOLUTION SUBCUTANEOUS
Qty: 2 | Refills: 0 | Status: ACTIVE | COMMUNITY
Start: 2020-06-15

## 2020-07-26 RX ORDER — LEVOTHYROXINE SODIUM 0.1 MG/1
TABLET ORAL
Qty: 30 | Refills: 0 | Status: ACTIVE | COMMUNITY
Start: 2015-04-03

## 2020-07-26 RX ORDER — NITROFURANTOIN 50 MG/1
CAPSULE ORAL
Qty: 30 | Refills: 0 | Status: ACTIVE | COMMUNITY
Start: 2015-10-23

## 2020-07-26 RX ORDER — MECLIZINE HCL 25 MG/1
TAKE ONE-HALF TO ONE TABLET BY MOUTH TWICE DAILY AS NEEDED TABLET, CHEWABLE ORAL
Qty: 20 | Refills: 0 | Status: ACTIVE | COMMUNITY
Start: 2018-10-04

## 2020-07-26 RX ORDER — METHYLPREDNISOLONE 4 MG/1
TABLET ORAL
Qty: 21 | Refills: 0 | Status: ACTIVE | COMMUNITY
Start: 2018-12-19

## 2020-07-26 RX ORDER — PRAZOSIN HYDROCHLORIDE 1 MG/1
CAPSULE ORAL
Qty: 60 | Refills: 0 | Status: ACTIVE | COMMUNITY
Start: 2015-02-02

## 2020-07-26 RX ORDER — OMEPRAZOLE 40 MG/1
CAPSULE, DELAYED RELEASE ORAL
Qty: 30 | Refills: 0 | Status: ACTIVE | COMMUNITY
Start: 2015-02-02

## 2020-07-26 RX ORDER — FEXOFENADINE HYDROCHLORIDE 180 MG/1
TAKE 1 TABLET EVERY MORNING TABLET ORAL
Qty: 30 | Refills: 0 | Status: ACTIVE | COMMUNITY
Start: 2018-12-11

## 2020-07-26 RX ORDER — LEVOTHYROXINE SODIUM 0.1 MG/1
TABLET ORAL
Qty: 30 | Refills: 0 | Status: ACTIVE | COMMUNITY
Start: 2015-02-02

## 2020-07-26 RX ORDER — ESTRADIOL 10 UG/1
INSERT VAGINAL
Qty: 18 | Refills: 0 | Status: ACTIVE | COMMUNITY
Start: 2016-10-26

## 2020-07-26 RX ORDER — LEVOTHYROXINE SODIUM 0.14 MG/1
TABLET ORAL
Qty: 30 | Refills: 0 | Status: ACTIVE | COMMUNITY
Start: 2018-07-18

## 2020-07-26 RX ORDER — HYDROCODONE POLISTIREX AND CHLORPHENIRAMINE POLISTIREX 10; 8 MG/5ML; MG/5ML
SUSPENSION, EXTENDED RELEASE ORAL
Qty: 60 | Refills: 0 | Status: ACTIVE | COMMUNITY
Start: 2017-02-13

## 2020-07-26 RX ORDER — TOPIRAMATE 25 MG/1
TABLET, FILM COATED ORAL
Qty: 70 | Refills: 0 | Status: ACTIVE | COMMUNITY
Start: 2017-05-10

## 2020-07-26 RX ORDER — METHENAMINE HIPPURATE 1 G/1
TAKE ONE TABLET BY MOUTH TWICE DAILY TABLET ORAL
Qty: 60 | Refills: 0 | Status: ACTIVE | COMMUNITY
Start: 2019-01-04

## 2020-07-26 RX ORDER — TRAZODONE HYDROCHLORIDE 150 MG/1
TABLET ORAL
Qty: 30 | Refills: 0 | Status: ACTIVE | COMMUNITY
Start: 2016-12-29

## 2020-07-26 RX ORDER — SERTRALINE HYDROCHLORIDE 112 UG/1
TAKE 1 TABLET DAILY TABLET ORAL
Refills: 0 | Status: ACTIVE | COMMUNITY

## 2020-07-26 RX ORDER — SUMATRIPTAN SUCCINATE 50 MG/1
TABLET, FILM COATED ORAL
Qty: 9 | Refills: 0 | Status: ACTIVE | COMMUNITY
Start: 2015-10-30

## 2020-07-26 RX ORDER — HYDROCODONE POLISTIREX AND CHLORPHENIRAMINE POLISTIREX 10; 8 MG/5ML; MG/5ML
SUSPENSION, EXTENDED RELEASE ORAL
Qty: 60 | Refills: 0 | Status: ACTIVE | COMMUNITY
Start: 2016-04-01

## 2020-07-26 RX ORDER — SUMATRIPTAN SUCCINATE 50 MG/1
TABLET, FILM COATED ORAL
Qty: 9 | Refills: 0 | Status: ACTIVE | COMMUNITY
Start: 2015-02-09

## 2020-07-26 RX ORDER — LIDOCAINE 50 MG/G
PATCH TOPICAL
Qty: 30 | Refills: 0 | Status: ACTIVE | COMMUNITY
Start: 2015-09-12

## 2020-07-26 RX ORDER — METFORMIN HYDROCHLORIDE 500 MG/1
TABLET, COATED ORAL
Qty: 30 | Refills: 0 | Status: ACTIVE | COMMUNITY
Start: 2015-05-04

## 2020-07-26 RX ORDER — ESTRADIOL 10 UG/1
TABLET VAGINAL
Qty: 8 | Refills: 0 | Status: ACTIVE | COMMUNITY
Start: 2016-11-16

## 2020-07-26 RX ORDER — MONTELUKAST SODIUM 10 MG/1
TAKE 1 TABLET AT BEDTIME TABLET, FILM COATED ORAL
Refills: 0 | Status: ACTIVE | COMMUNITY

## 2020-07-26 RX ORDER — TRIAMTERENE AND HYDROCHLOROTHIAZIDE 37.5; 25 MG/1; MG/1
TABLET ORAL
Qty: 30 | Refills: 0 | Status: ACTIVE | COMMUNITY
Start: 2015-03-03

## 2020-07-26 RX ORDER — FLUCONAZOLE 100 MG/1
TABLET ORAL
Qty: 7 | Refills: 0 | Status: ACTIVE | COMMUNITY
Start: 2015-07-29

## 2020-07-26 RX ORDER — PRAZOSIN HYDROCHLORIDE 1 MG/1
CAPSULE ORAL
Qty: 60 | Refills: 0 | Status: ACTIVE | COMMUNITY
Start: 2015-04-03

## 2020-07-26 RX ORDER — DIPHENOXYLATE HCL/ATROPINE 2.5-.025MG
TAKE 1 TABLET 4 TIMES DAILY AS NEEDED TABLET ORAL
Refills: 0 | Status: ACTIVE | COMMUNITY

## 2020-07-26 RX ORDER — MELOXICAM 15 MG/1
TABLET ORAL
Qty: 30 | Refills: 0 | Status: ACTIVE | COMMUNITY
Start: 2020-06-19

## 2020-07-26 RX ORDER — KETOROLAC TROMETHAMINE 10 MG/1
TABLET, FILM COATED ORAL
Qty: 20 | Refills: 0 | Status: ACTIVE | COMMUNITY
Start: 2018-08-23

## 2020-07-26 RX ORDER — BUDESONIDE AND FORMOTEROL FUMARATE DIHYDRATE 160; 4.5 UG/1; UG/1
INHALE 2 PUFFS TWICE DAILY. RINSE MOUTH AFTER USE AEROSOL RESPIRATORY (INHALATION)
Refills: 0 | Status: ACTIVE | COMMUNITY

## 2020-07-26 RX ORDER — NITROFURANTOIN MONOHYDRATE/MACROCRYSTALLINE 25; 75 MG/1; MG/1
CAPSULE ORAL
Qty: 6 | Refills: 0 | Status: ACTIVE | COMMUNITY
Start: 2016-04-12

## 2020-07-26 RX ORDER — OSELTAMIVIR PHOSPHATE 75 MG/1
CAPSULE ORAL
Qty: 10 | Refills: 0 | Status: ACTIVE | COMMUNITY
Start: 2019-02-25

## 2020-07-26 RX ORDER — TOPIRAMATE 100 MG/1
CAPSULE, EXTENDED RELEASE ORAL
Qty: 30 | Refills: 0 | Status: ACTIVE | COMMUNITY
Start: 2018-08-16

## 2020-07-26 RX ORDER — DOXYCYCLINE 100 MG/1
CAPSULE ORAL
Qty: 20 | Refills: 0 | Status: ACTIVE | COMMUNITY
Start: 2016-03-29

## 2020-07-26 RX ORDER — MIRABEGRON 25 MG/1
TABLET, FILM COATED, EXTENDED RELEASE ORAL
Qty: 30 | Refills: 0 | Status: ACTIVE | COMMUNITY
Start: 2015-02-02

## 2020-07-26 RX ORDER — PHENAZOPYRIDINE HYDROCHLORIDE 200 MG/1
TAKE ONE TABLET THREE TIMES DAILY WITH FOOD TABLET, FILM COATED ORAL
Qty: 6 | Refills: 0 | Status: ACTIVE | COMMUNITY
Start: 2019-10-05

## 2020-07-26 RX ORDER — TRIAMTERENE AND HYDROCHLOROTHIAZIDE 37.5; 25 MG/1; MG/1
CAPSULE ORAL
Qty: 30 | Refills: 0 | Status: ACTIVE | COMMUNITY
Start: 2015-04-03

## 2020-07-26 RX ORDER — TRAZODONE HYDROCHLORIDE 150 MG/1
TAKE ONE TABLET BY MOUTH AT BEDTIME TABLET ORAL
Qty: 30 | Refills: 0 | Status: ACTIVE | COMMUNITY
Start: 2019-03-12

## 2020-07-26 RX ORDER — CIPROFLOXACIN HYDROCHLORIDE 500 MG/1
TABLET, FILM COATED ORAL
Qty: 10 | Refills: 0 | Status: ACTIVE | COMMUNITY
Start: 2017-02-13

## 2020-07-26 RX ORDER — NITROFURANTOIN MONOHYDRATE/MACROCRYSTALLINE 25; 75 MG/1; MG/1
CAPSULE ORAL
Qty: 14 | Refills: 0 | Status: ACTIVE | COMMUNITY
Start: 2015-05-29

## 2020-07-26 RX ORDER — SULFAMETHOXAZOLE AND TRIMETHOPRIM 800; 160 MG/1; MG/1
TABLET ORAL
Qty: 20 | Refills: 0 | Status: ACTIVE | COMMUNITY
Start: 2015-06-16

## 2020-07-26 RX ORDER — MIRABEGRON 25 MG/1
TABLET, FILM COATED, EXTENDED RELEASE ORAL
Qty: 30 | Refills: 0 | Status: ACTIVE | COMMUNITY
Start: 2015-10-29

## 2020-07-26 RX ORDER — SUMATRIPTAN SUCCINATE 6 MG/.5ML
INJECTION, SOLUTION SUBCUTANEOUS
Qty: 2 | Refills: 0 | Status: ACTIVE | COMMUNITY
Start: 2016-09-27

## 2020-07-26 RX ORDER — ICOSAPENT ETHYL 500 MG/1
TAKE 1 CAPSULE TWICE DAILY CAPSULE ORAL
Refills: 0 | Status: ACTIVE | COMMUNITY

## 2020-07-26 RX ORDER — FLUCONAZOLE 150 MG/1
TABLET ORAL
Qty: 1 | Refills: 0 | Status: ACTIVE | COMMUNITY
Start: 2016-01-26

## 2020-07-26 RX ORDER — BENZONATATE 200 MG/1
CAPSULE ORAL
Qty: 30 | Refills: 0 | Status: ACTIVE | COMMUNITY
Start: 2016-03-29

## 2020-07-26 RX ORDER — TRAZODONE HYDROCHLORIDE 150 MG/1
TABLET ORAL
Qty: 30 | Refills: 0 | Status: ACTIVE | COMMUNITY
Start: 2019-02-11

## 2020-07-26 RX ORDER — PROCHLORPERAZINE MALEATE 10 MG/1
TABLET ORAL
Qty: 30 | Refills: 0 | Status: ACTIVE | COMMUNITY
Start: 2016-06-29

## 2020-07-26 RX ORDER — TIZANIDINE 4 MG/1
TABLET ORAL
Qty: 30 | Refills: 0 | Status: ACTIVE | COMMUNITY
Start: 2019-06-18

## 2020-07-26 RX ORDER — TRAZODONE HYDROCHLORIDE 150 MG/1
TABLET ORAL
Qty: 30 | Refills: 0 | Status: ACTIVE | COMMUNITY
Start: 2016-01-26

## 2020-10-28 ENCOUNTER — WEB ENCOUNTER (OUTPATIENT)
Dept: URBAN - METROPOLITAN AREA CLINIC 113 | Facility: CLINIC | Age: 70
End: 2020-10-28

## 2020-10-28 ENCOUNTER — OFFICE VISIT (OUTPATIENT)
Dept: URBAN - METROPOLITAN AREA CLINIC 113 | Facility: CLINIC | Age: 70
End: 2020-10-28
Payer: MEDICARE

## 2020-10-28 VITALS
DIASTOLIC BLOOD PRESSURE: 76 MMHG | RESPIRATION RATE: 18 BRPM | WEIGHT: 148 LBS | HEART RATE: 93 BPM | SYSTOLIC BLOOD PRESSURE: 122 MMHG | TEMPERATURE: 97.6 F | BODY MASS INDEX: 29.84 KG/M2 | HEIGHT: 59 IN

## 2020-10-28 DIAGNOSIS — A09 DIARRHEA OF INFECTIOUS ORIGIN: ICD-10-CM

## 2020-10-28 DIAGNOSIS — A04.72 C. DIFFICILE COLITIS: ICD-10-CM

## 2020-10-28 PROCEDURE — G8421 BMI NOT CALCULATED: HCPCS | Performed by: INTERNAL MEDICINE

## 2020-10-28 PROCEDURE — 99213 OFFICE O/P EST LOW 20 MIN: CPT | Performed by: INTERNAL MEDICINE

## 2020-10-28 PROCEDURE — G2100 PT 66+ FRAILTY AND MED DEM: HCPCS | Performed by: INTERNAL MEDICINE

## 2020-10-28 PROCEDURE — 1036F TOBACCO NON-USER: CPT | Performed by: INTERNAL MEDICINE

## 2020-10-28 PROCEDURE — G8483 FLU IMM NO ADMIN DOC REA: HCPCS | Performed by: INTERNAL MEDICINE

## 2020-10-28 RX ORDER — ESTRADIOL 10 UG/1
INSERT VAGINAL
Qty: 18 | Refills: 0 | Status: DISCONTINUED | COMMUNITY
Start: 2016-04-19

## 2020-10-28 RX ORDER — OSELTAMIVIR PHOSPHATE 75 MG/1
CAPSULE ORAL
Qty: 10 | Refills: 0 | Status: DISCONTINUED | COMMUNITY
Start: 2019-02-25

## 2020-10-28 RX ORDER — EZETIMIBE 10 MG/1
TABLET ORAL
Qty: 30 | Refills: 0 | Status: DISCONTINUED | COMMUNITY
Start: 2016-05-18

## 2020-10-28 RX ORDER — ESTRADIOL 0.1 MG/G
CREAM VAGINAL
Qty: 42 | Refills: 0 | Status: DISCONTINUED | COMMUNITY
Start: 2018-08-07

## 2020-10-28 RX ORDER — SERTRALINE 100 MG/1
TAKE 1 TABLET DAILY AS DIRECTED TABLET, FILM COATED ORAL
Refills: 0 | Status: ACTIVE | COMMUNITY

## 2020-10-28 RX ORDER — TOPIRAMATE 100 MG/1
TAKE 1 CAPSULE BEDTIME CAPSULE, EXTENDED RELEASE ORAL
Refills: 0 | Status: ACTIVE | COMMUNITY

## 2020-10-28 RX ORDER — OXYCODONE AND ACETAMINOPHEN 5; 325 MG/1; MG/1
TABLET ORAL
Qty: 40 | Refills: 0 | Status: DISCONTINUED | COMMUNITY
Start: 2015-04-02

## 2020-10-28 RX ORDER — MONTELUKAST SODIUM 10 MG/1
TAKE 1 TABLET AT BEDTIME TABLET, FILM COATED ORAL
Refills: 0 | Status: DISCONTINUED | COMMUNITY

## 2020-10-28 RX ORDER — PHENAZOPYRIDINE HYDROCHLORIDE 200 MG/1
TAKE ONE TABLET THREE TIMES DAILY WITH FOOD TABLET, FILM COATED ORAL
Qty: 6 | Refills: 0 | Status: DISCONTINUED | COMMUNITY
Start: 2019-10-05

## 2020-10-28 RX ORDER — SULFAMETHOXAZOLE AND TRIMETHOPRIM 800; 160 MG/1; MG/1
TABLET ORAL
Qty: 20 | Refills: 0 | Status: DISCONTINUED | COMMUNITY
Start: 2015-02-17

## 2020-10-28 RX ORDER — NABUMETONE 500 MG/1
1 TABLET TABLET, FILM COATED ORAL TWICE A DAY
Status: ACTIVE | COMMUNITY

## 2020-10-28 RX ORDER — CHOLESTYRAMINE 4 G/5.5G
POWDER, FOR SUSPENSION ORAL
Qty: 60 | Refills: 0 | Status: DISCONTINUED | COMMUNITY
Start: 2015-11-18

## 2020-10-28 RX ORDER — PRAZOSIN HYDROCHLORIDE 1 MG/1
CAPSULE ORAL
Qty: 60 | Refills: 0 | Status: DISCONTINUED | COMMUNITY
Start: 2015-02-02

## 2020-10-28 RX ORDER — FLUCONAZOLE 100 MG/1
TABLET ORAL
Qty: 7 | Refills: 0 | Status: DISCONTINUED | COMMUNITY
Start: 2015-07-29

## 2020-10-28 RX ORDER — BUDESONIDE AND FORMOTEROL FUMARATE DIHYDRATE 160; 4.5 UG/1; UG/1
INHALE 2 PUFFS TWICE DAILY. RINSE MOUTH AFTER USE AEROSOL RESPIRATORY (INHALATION)
Refills: 0 | Status: ACTIVE | COMMUNITY

## 2020-10-28 RX ORDER — SERTRALINE HYDROCHLORIDE 112 UG/1
TAKE 1 TABLET DAILY TABLET ORAL
Refills: 0 | Status: ACTIVE | COMMUNITY

## 2020-10-28 RX ORDER — ACETAMINOPHEN 500 MG/1
TAKE 2 TABLET TWICE DAILY TABLET, FILM COATED ORAL
Refills: 0 | Status: DISCONTINUED | COMMUNITY

## 2020-10-28 RX ORDER — TRAZODONE HYDROCHLORIDE 100 MG/1
TAKE 1.5 TABLET BEDTIME TABLET, FILM COATED ORAL
Qty: 45 | Refills: 1 | Status: DISCONTINUED | COMMUNITY
Start: 2015-02-02

## 2020-10-28 RX ORDER — NITROFURANTOIN 50 MG/1
CAPSULE ORAL
Qty: 30 | Refills: 0 | Status: DISCONTINUED | COMMUNITY
Start: 2015-08-31

## 2020-10-28 RX ORDER — HYDROCODONE BITARTRATE AND ACETAMINOPHEN 7.5; 325 MG/1; MG/1
TABLET ORAL
Qty: 15 | Refills: 0 | Status: DISCONTINUED | COMMUNITY
Start: 2016-06-29

## 2020-10-28 RX ORDER — FAMOTIDINE 40 MG/1
TAKE 1 TABLET AT BEDTIME TABLET ORAL
Refills: 0 | Status: ACTIVE | COMMUNITY

## 2020-10-28 RX ORDER — LEVOFLOXACIN 500 MG/1
TABLET, FILM COATED ORAL
Qty: 10 | Refills: 0 | Status: DISCONTINUED | COMMUNITY
Start: 2019-12-02

## 2020-10-28 RX ORDER — SUMATRIPTAN SUCCINATE 6 MG/.5ML
INJECTION, SOLUTION SUBCUTANEOUS
Qty: 2 | Refills: 0 | Status: DISCONTINUED | COMMUNITY
Start: 2017-05-04

## 2020-10-28 RX ORDER — NORTRIPTYLINE HYDROCHLORIDE 10 MG/1
CAPSULE ORAL
Qty: 30 | Refills: 0 | Status: DISCONTINUED | COMMUNITY
Start: 2015-02-02

## 2020-10-28 RX ORDER — FEXOFENADINE HCL 180 MG/1
TAKE 1 TABLET EVERY MORNING TABLET, FILM COATED ORAL
Qty: 30 | Refills: 0 | Status: ACTIVE | COMMUNITY
Start: 2019-05-10

## 2020-10-28 RX ORDER — DIPHENOXYLATE HYDROCHLORIDE AND ATROPINE SULFATE 2.5; .025 MG/1; MG/1
TABLET ORAL
Qty: 20 | Refills: 0 | Status: DISCONTINUED | COMMUNITY
Start: 2015-11-10

## 2020-10-28 RX ORDER — FAMOTIDINE 40 MG/1
TAKE 1 TABLET BEDTIME TABLET, FILM COATED ORAL
Refills: 0 | Status: DISCONTINUED | COMMUNITY

## 2020-10-28 RX ORDER — FEXOFENADINE HYDROCHLORIDE 180 MG/1
TAKE 1 TABLET EVERY MORNING TABLET ORAL
Qty: 30 | Refills: 0 | Status: DISCONTINUED | COMMUNITY
Start: 2018-10-12

## 2020-10-28 RX ORDER — PROCHLORPERAZINE MALEATE 10 MG/1
TABLET ORAL
Qty: 30 | Refills: 0 | Status: DISCONTINUED | COMMUNITY
Start: 2016-06-29

## 2020-10-28 RX ORDER — NITROFURANTOIN MONOHYDRATE/MACROCRYSTALLINE 25; 75 MG/1; MG/1
CAPSULE ORAL
Qty: 14 | Refills: 0 | Status: DISCONTINUED | COMMUNITY
Start: 2015-05-29

## 2020-10-28 RX ORDER — ESTRADIOL 10 UG/1
TABLET VAGINAL
Qty: 8 | Refills: 0 | Status: DISCONTINUED | COMMUNITY
Start: 2016-11-16

## 2020-10-28 RX ORDER — AZELASTINE HYDROCHLORIDE 137 UG/1
SPRAY, METERED NASAL
Qty: 30 | Refills: 0 | Status: DISCONTINUED | COMMUNITY
Start: 2018-04-13

## 2020-10-28 RX ORDER — MECLIZINE HCL 25 MG/1
TAKE ONE-HALF TO ONE TABLET BY MOUTH TWICE DAILY AS NEEDED TABLET, CHEWABLE ORAL
Qty: 20 | Refills: 0 | Status: DISCONTINUED | COMMUNITY
Start: 2018-10-04

## 2020-10-28 RX ORDER — CHOLESTYRAMINE 4 G/9G
MIX 1 SCOOP IN LIQUID AND DRINK ONCE DAILY POWDER, FOR SUSPENSION ORAL
Refills: 0 | Status: DISCONTINUED | COMMUNITY

## 2020-10-28 RX ORDER — VANCOMYCIN HYDROCHLORIDE 125 MG/1
TAKE 1 CAPSULE 4 TIMES DAILY CAPSULE ORAL
Qty: 56 | Refills: 0 | Status: DISCONTINUED | COMMUNITY
Start: 2020-02-13

## 2020-10-28 RX ORDER — TRIAMTERENE AND HYDROCHLOROTHIAZIDE 37.5; 25 MG/1; MG/1
CAPSULE ORAL
Qty: 30 | Refills: 0 | Status: DISCONTINUED | COMMUNITY
Start: 2015-04-03

## 2020-10-28 RX ORDER — IBUPROFEN 800 MG/1
TABLET ORAL
Qty: 20 | Refills: 0 | Status: DISCONTINUED | COMMUNITY
Start: 2017-08-05

## 2020-10-28 RX ORDER — ONDANSETRON HYDROCHLORIDE 4 MG/1
TABLET, FILM COATED ORAL
Qty: 10 | Refills: 0 | Status: DISCONTINUED | COMMUNITY
Start: 2018-10-15

## 2020-10-28 RX ORDER — FLUTICASONE PROPIONATE 50 UG/1
SPRAY, METERED NASAL
Qty: 16 | Refills: 0 | Status: DISCONTINUED | COMMUNITY
Start: 2016-03-29

## 2020-10-28 RX ORDER — POLYMYXIN B SULFATE AND TRIMETHOPRIM SULFATE 10000; 1 [USP'U]/ML; MG/ML
SOLUTION/ DROPS OPHTHALMIC
Qty: 10 | Refills: 0 | Status: DISCONTINUED | COMMUNITY
Start: 2018-04-03

## 2020-10-28 RX ORDER — BENZONATATE 200 MG/1
CAPSULE ORAL
Qty: 30 | Refills: 0 | Status: DISCONTINUED | COMMUNITY
Start: 2016-03-29

## 2020-10-28 RX ORDER — SUMATRIPTAN SUCCINATE 6 MG/.5ML
INJECTION, SOLUTION SUBCUTANEOUS
Qty: 1 | Refills: 0 | Status: DISCONTINUED | COMMUNITY
Start: 2015-04-13

## 2020-10-28 RX ORDER — HYDROCODONE POLISTIREX AND CHLORPHENIRAMINE POLISTIREX 10; 8 MG/5ML; MG/5ML
SUSPENSION, EXTENDED RELEASE ORAL
Qty: 60 | Refills: 0 | Status: DISCONTINUED | COMMUNITY
Start: 2016-04-01

## 2020-10-28 RX ORDER — DICLOFENAC SODIUM 10 MG/G
GEL TOPICAL
Qty: 500 | Refills: 0 | Status: DISCONTINUED | COMMUNITY
Start: 2016-06-08

## 2020-10-28 RX ORDER — MELOXICAM 15 MG/1
TABLET ORAL
Qty: 30 | Refills: 0 | Status: DISCONTINUED | COMMUNITY
Start: 2020-06-19

## 2020-10-28 RX ORDER — TRAMADOL HYDROCHLORIDE 50 MG/1
TABLET ORAL
Qty: 20 | Refills: 0 | Status: DISCONTINUED | COMMUNITY
Start: 2018-08-28

## 2020-10-28 RX ORDER — HYDROCODONE BITARTRATE AND ACETAMINOPHEN 5; 325 MG/1; MG/1
TABLET ORAL
Qty: 30 | Refills: 0 | Status: DISCONTINUED | COMMUNITY
Start: 2015-11-05

## 2020-10-28 RX ORDER — TIZANIDINE 4 MG/1
TABLET ORAL
Qty: 30 | Refills: 0 | Status: DISCONTINUED | COMMUNITY
Start: 2019-05-16

## 2020-10-28 RX ORDER — METRONIDAZOLE 500 MG/1
TABLET ORAL
Qty: 42 | Refills: 0 | Status: DISCONTINUED | COMMUNITY
Start: 2015-11-10

## 2020-10-28 RX ORDER — DOXYCYCLINE 100 MG/1
CAPSULE ORAL
Qty: 20 | Refills: 0 | Status: DISCONTINUED | COMMUNITY
Start: 2016-03-29

## 2020-10-28 RX ORDER — TRIAMTERENE AND HYDROCHLOROTHIAZIDE 37.5; 25 MG/1; MG/1
TABLET ORAL
Qty: 30 | Refills: 0 | Status: DISCONTINUED | COMMUNITY
Start: 2015-02-02

## 2020-10-28 RX ORDER — SULFAMETHOXAZOLE AND TRIMETHOPRIM 400; 80 MG/1; MG/1
TABLET ORAL
Qty: 30 | Refills: 0 | Status: DISCONTINUED | COMMUNITY
Start: 2015-08-31

## 2020-10-28 RX ORDER — EVOLOCUMAB 140 MG/ML
INJECTION, SOLUTION SUBCUTANEOUS
Qty: 2 | Refills: 0 | Status: ACTIVE | COMMUNITY
Start: 2020-05-15

## 2020-10-28 RX ORDER — LEVOTHYROXINE SODIUM 0.1 MG/1
TABLET ORAL
Qty: 30 | Refills: 0 | Status: DISCONTINUED | COMMUNITY
Start: 2015-02-02

## 2020-10-28 RX ORDER — ALBUTEROL SULFATE 90 UG/1
AEROSOL, METERED RESPIRATORY (INHALATION)
Qty: 18 | Refills: 0 | Status: DISCONTINUED | COMMUNITY
Start: 2017-07-10

## 2020-10-28 RX ORDER — METFORMIN HYDROCHLORIDE 500 MG/1
TABLET, COATED ORAL
Qty: 30 | Refills: 0 | Status: DISCONTINUED | COMMUNITY
Start: 2015-02-02

## 2020-10-28 RX ORDER — CLARITHROMYCIN 500 MG/1
TABLET, FILM COATED ORAL
Qty: 20 | Refills: 0 | Status: DISCONTINUED | COMMUNITY
Start: 2018-03-14

## 2020-10-28 RX ORDER — METHENAMINE HIPPURATE 1 G/1
TAKE ONE TABLET BY MOUTH TWICE DAILY TABLET ORAL
Qty: 60 | Refills: 0 | Status: ACTIVE | COMMUNITY
Start: 2019-01-04

## 2020-10-28 RX ORDER — VALACYCLOVIR 1 G/1
TABLET, FILM COATED ORAL
Qty: 30 | Refills: 0 | Status: DISCONTINUED | COMMUNITY
Start: 2019-03-25

## 2020-10-28 RX ORDER — CARISOPRODOL 350 MG/1
TABLET ORAL
Qty: 90 | Refills: 0 | Status: DISCONTINUED | COMMUNITY
Start: 2015-04-13

## 2020-10-28 RX ORDER — ESTRADIOL 10 UG/1
INSERT ONE VAGINALLY TWICE A WEEK AT BEDTIME AS DIRECTED TABLET, FILM COATED VAGINAL
Qty: 8 | Refills: 0 | Status: DISCONTINUED | COMMUNITY
Start: 2019-01-18

## 2020-10-28 RX ORDER — METHYLPREDNISOLONE 4 MG/1
TABLET ORAL
Qty: 21 | Refills: 0 | Status: DISCONTINUED | COMMUNITY
Start: 2016-09-26

## 2020-10-28 RX ORDER — LIDOCAINE 50 MG/G
PATCH TOPICAL
Qty: 30 | Refills: 0 | Status: DISCONTINUED | COMMUNITY
Start: 2015-09-12

## 2020-10-28 RX ORDER — VANCOMYCIN HYDROCHLORIDE 250 MG/1
TAKE 1 CAPSULE 4 TIMES DAILY CAPSULE ORAL
Qty: 56 | Refills: 0 | Status: DISCONTINUED | COMMUNITY
Start: 2020-07-08

## 2020-10-28 RX ORDER — WHEAT DEXTRIN 3 G/4 G
TAKE 1 TBSP DAILY PT UNSURE OF DOSAGE POWDER (GRAM) ORAL
Refills: 0 | Status: ACTIVE | COMMUNITY

## 2020-10-28 RX ORDER — MECLIZINE HYDROCHLORIDE 12.5 MG/1
TAKE 1-2 TABLETS DAILY AS DIRECTED TABLET ORAL
Refills: 0 | Status: ACTIVE | COMMUNITY

## 2020-10-28 RX ORDER — MIRABEGRON 25 MG/1
TABLET, FILM COATED, EXTENDED RELEASE ORAL
Qty: 30 | Refills: 0 | Status: DISCONTINUED | COMMUNITY
Start: 2015-02-02

## 2020-10-28 RX ORDER — CIPROFLOXACIN HYDROCHLORIDE 500 MG/1
TABLET, FILM COATED ORAL
Qty: 20 | Refills: 0 | Status: DISCONTINUED | COMMUNITY
Start: 2015-07-13

## 2020-10-28 RX ORDER — TOPIRAMATE 25 MG/1
TABLET, FILM COATED ORAL
Qty: 70 | Refills: 0 | Status: DISCONTINUED | COMMUNITY
Start: 2017-05-10

## 2020-10-28 RX ORDER — TRAZODONE HYDROCHLORIDE 150 MG/1
TABLET ORAL
Qty: 30 | Refills: 0 | Status: DISCONTINUED | COMMUNITY
Start: 2015-10-29

## 2020-10-28 RX ORDER — OMEPRAZOLE 40 MG/1
CAPSULE, DELAYED RELEASE ORAL
Qty: 30 | Refills: 0 | Status: DISCONTINUED | COMMUNITY
Start: 2015-02-02

## 2020-10-28 RX ORDER — ONDANSETRON 8 MG/1
TABLET ORAL
Qty: 14 | Refills: 0 | Status: DISCONTINUED | COMMUNITY
Start: 2015-06-16

## 2020-10-28 RX ORDER — CIPROFLOXACIN HYDROCHLORIDE 250 MG/1
TABLET, FILM COATED ORAL
Qty: 14 | Refills: 0 | Status: DISCONTINUED | COMMUNITY
Start: 2016-08-02

## 2020-10-28 RX ORDER — DIPHENOXYLATE HCL/ATROPINE 2.5-.025MG
TAKE 1 TABLET 4 TIMES DAILY AS NEEDED TABLET ORAL
Refills: 0 | Status: DISCONTINUED | COMMUNITY

## 2020-10-28 RX ORDER — KETOROLAC TROMETHAMINE 10 MG/1
TABLET, FILM COATED ORAL
Qty: 20 | Refills: 0 | Status: DISCONTINUED | COMMUNITY
Start: 2018-08-23

## 2020-10-28 RX ORDER — GABAPENTIN 100 MG/1
CAPSULE ORAL
Qty: 30 | Refills: 0 | Status: ACTIVE | COMMUNITY
Start: 2018-08-30

## 2020-10-28 RX ORDER — FLUCONAZOLE 150 MG/1
TABLET ORAL
Qty: 1 | Refills: 0 | Status: DISCONTINUED | COMMUNITY
Start: 2016-01-26

## 2020-10-28 RX ORDER — ALPRAZOLAM 1 MG/1
TAKE 1 TABLET 4 TIMES DAILY TABLET ORAL
Refills: 0 | Status: ACTIVE | COMMUNITY
Start: 2015-02-02

## 2020-10-28 RX ORDER — TAMSULOSIN HYDROCHLORIDE 0.4 MG/1
TAKE 1 CAPSULE DAILY CAPSULE ORAL
Refills: 0 | Status: DISCONTINUED | COMMUNITY

## 2020-10-28 RX ORDER — LEVOTHYROXINE SODIUM 0.14 MG/1
TABLET ORAL
Qty: 14 | Refills: 0 | Status: DISCONTINUED | COMMUNITY
Start: 2016-02-10

## 2020-10-28 RX ORDER — ICOSAPENT ETHYL 500 MG/1
TAKE 1 CAPSULE TWICE DAILY CAPSULE ORAL
Refills: 0 | Status: DISCONTINUED | COMMUNITY

## 2020-10-28 RX ORDER — SUCRALFATE 1 G/1
TABLET ORAL
Qty: 60 | Refills: 0 | Status: DISCONTINUED | COMMUNITY
Start: 2015-02-02

## 2020-10-28 RX ORDER — SUMATRIPTAN SUCCINATE 50 MG/1
TABLET, FILM COATED ORAL
Qty: 9 | Refills: 0 | Status: DISCONTINUED | COMMUNITY
Start: 2015-02-09

## 2020-10-28 RX ORDER — MONTELUKAST SODIUM 10 MG/1
1 TABLET TABLET, FILM COATED ORAL ONCE A DAY
Refills: 0 | Status: ACTIVE | COMMUNITY
Start: 2017-02-13

## 2020-10-28 RX ORDER — DICYCLOMINE HYDROCHLORIDE 10 MG/1
TAKE 1 CAPSULE BY MOUTH EVERY 4 TO 6 HOURS AS NEEDED FOR ABDOMINAL PAIN CAPSULE ORAL
Qty: 60 | Refills: 2 | Status: ACTIVE | COMMUNITY
Start: 2020-04-13

## 2020-10-28 RX ORDER — PREDNISONE 10 MG/1
TABLET ORAL
Qty: 60 | Refills: 0 | Status: DISCONTINUED | COMMUNITY
Start: 2016-04-04

## 2020-10-28 RX ORDER — AZELASTINE HYDROCHLORIDE AND FLUTICASONE PROPIONATE 137; 50 UG/1; UG/1
INSTILL 1 SQUIRT DAILY SPRAY, METERED NASAL
Refills: 0 | Status: DISCONTINUED | COMMUNITY

## 2020-10-28 RX ORDER — PANCRELIPASE 60000; 12000; 38000 [USP'U]/1; [USP'U]/1; [USP'U]/1
CAPSULE, DELAYED RELEASE PELLETS ORAL
Qty: 100 | Refills: 0 | Status: DISCONTINUED | COMMUNITY
Start: 2016-08-31

## 2020-10-28 NOTE — HPI-TODAY'S VISIT:
68 y/o female with a hx of CDI presenting for f/u. She was last seen in the clinic July 2020. At that time whe was seen as f/u for recurrent CDI after a course of bactrim. Seh completed her therapy and denied any abdominal pain. She did have intermittent diarrhea. We placed her on daily fiber and considered a possible probiotic.   Since July she has had another course of ABX for cdiff. Currently she feels well. She denies any diarrhea or abdominal pain.  ------ [facility] [place]2020 diverticulosis, hemorrhiods, random bx negative.

## 2020-11-09 ENCOUNTER — TELEPHONE ENCOUNTER (OUTPATIENT)
Dept: URBAN - METROPOLITAN AREA CLINIC 113 | Facility: CLINIC | Age: 70
End: 2020-11-09

## 2020-11-12 ENCOUNTER — OFFICE VISIT (OUTPATIENT)
Dept: URBAN - METROPOLITAN AREA CLINIC 113 | Facility: CLINIC | Age: 70
End: 2020-11-12

## 2020-11-12 ENCOUNTER — WEB ENCOUNTER (OUTPATIENT)
Dept: URBAN - METROPOLITAN AREA CLINIC 113 | Facility: CLINIC | Age: 70
End: 2020-11-12

## 2020-12-22 ENCOUNTER — OFFICE VISIT (OUTPATIENT)
Dept: URBAN - METROPOLITAN AREA CLINIC 113 | Facility: CLINIC | Age: 70
End: 2020-12-22

## 2020-12-23 ENCOUNTER — WEB ENCOUNTER (OUTPATIENT)
Dept: URBAN - METROPOLITAN AREA CLINIC 113 | Facility: CLINIC | Age: 70
End: 2020-12-23

## 2020-12-23 ENCOUNTER — OFFICE VISIT (OUTPATIENT)
Dept: URBAN - METROPOLITAN AREA CLINIC 113 | Facility: CLINIC | Age: 70
End: 2020-12-23
Payer: MEDICARE

## 2020-12-23 VITALS
TEMPERATURE: 98 F | HEIGHT: 59 IN | RESPIRATION RATE: 18 BRPM | DIASTOLIC BLOOD PRESSURE: 75 MMHG | WEIGHT: 148 LBS | SYSTOLIC BLOOD PRESSURE: 116 MMHG | BODY MASS INDEX: 29.84 KG/M2 | HEART RATE: 103 BPM

## 2020-12-23 DIAGNOSIS — R10.32 LEFT LOWER QUADRANT ABDOMINAL PAIN: ICD-10-CM

## 2020-12-23 DIAGNOSIS — K57.92 DIVERTICULITIS: ICD-10-CM

## 2020-12-23 DIAGNOSIS — K21.9 GASTROESOPHAGEAL REFLUX DISEASE, UNSPECIFIED WHETHER ESOPHAGITIS PRESENT: ICD-10-CM

## 2020-12-23 PROCEDURE — G8427 DOCREV CUR MEDS BY ELIG CLIN: HCPCS | Performed by: NURSE PRACTITIONER

## 2020-12-23 PROCEDURE — G8482 FLU IMMUNIZE ORDER/ADMIN: HCPCS | Performed by: NURSE PRACTITIONER

## 2020-12-23 PROCEDURE — G9903 PT SCRN TBCO ID AS NON USER: HCPCS | Performed by: NURSE PRACTITIONER

## 2020-12-23 PROCEDURE — 99213 OFFICE O/P EST LOW 20 MIN: CPT | Performed by: NURSE PRACTITIONER

## 2020-12-23 RX ORDER — EVOLOCUMAB 140 MG/ML
INJECTION, SOLUTION SUBCUTANEOUS
Qty: 2 | Refills: 0 | Status: ACTIVE | COMMUNITY
Start: 2020-05-15

## 2020-12-23 RX ORDER — FAMOTIDINE 40 MG/1
TAKE 1 TABLET AT BEDTIME TABLET ORAL
Refills: 0 | Status: ACTIVE | COMMUNITY

## 2020-12-23 RX ORDER — METHENAMINE HIPPURATE 1 G/1
TAKE ONE TABLET BY MOUTH TWICE DAILY TABLET ORAL
Qty: 60 | Refills: 0 | Status: ACTIVE | COMMUNITY
Start: 2019-01-04

## 2020-12-23 RX ORDER — MECLIZINE HYDROCHLORIDE 12.5 MG/1
TAKE 1-2 TABLETS DAILY AS DIRECTED TABLET ORAL
Refills: 0 | Status: ACTIVE | COMMUNITY

## 2020-12-23 RX ORDER — FEXOFENADINE HCL 180 MG/1
TAKE 1 TABLET EVERY MORNING TABLET, FILM COATED ORAL
Qty: 30 | Refills: 0 | Status: ACTIVE | COMMUNITY
Start: 2019-05-10

## 2020-12-23 RX ORDER — TOPIRAMATE 100 MG/1
TAKE 1 CAPSULE BEDTIME CAPSULE, EXTENDED RELEASE ORAL
Refills: 0 | Status: ACTIVE | COMMUNITY

## 2020-12-23 RX ORDER — SERTRALINE HYDROCHLORIDE 112 UG/1
TAKE 1 TABLET DAILY TABLET ORAL
Refills: 0 | Status: ACTIVE | COMMUNITY

## 2020-12-23 RX ORDER — GABAPENTIN 100 MG/1
1 TABLET CAPSULE ORAL ONCE A DAY
Refills: 0 | Status: ACTIVE | COMMUNITY
Start: 2018-08-30

## 2020-12-23 RX ORDER — DICYCLOMINE HYDROCHLORIDE 10 MG/1
TAKE 1 CAPSULE BY MOUTH EVERY 4 TO 6 HOURS AS NEEDED FOR ABDOMINAL PAIN CAPSULE ORAL
Qty: 60 | Refills: 2 | Status: ACTIVE | COMMUNITY
Start: 2020-04-13

## 2020-12-23 RX ORDER — NABUMETONE 500 MG/1
1 TABLET TABLET, FILM COATED ORAL TWICE A DAY
Status: ACTIVE | COMMUNITY

## 2020-12-23 RX ORDER — MONTELUKAST SODIUM 10 MG/1
1 TABLET TABLET, FILM COATED ORAL ONCE A DAY
Refills: 0 | Status: ACTIVE | COMMUNITY
Start: 2017-02-13

## 2020-12-23 RX ORDER — BUDESONIDE AND FORMOTEROL FUMARATE DIHYDRATE 160; 4.5 UG/1; UG/1
INHALE 2 PUFFS TWICE DAILY. RINSE MOUTH AFTER USE AEROSOL RESPIRATORY (INHALATION)
Refills: 0 | Status: ACTIVE | COMMUNITY

## 2020-12-23 RX ORDER — ALPRAZOLAM 1 MG/1
TAKE 1 TABLET 4 TIMES DAILY TABLET ORAL
Refills: 0 | Status: ACTIVE | COMMUNITY
Start: 2015-02-02

## 2020-12-23 RX ORDER — OMEPRAZOLE 40 MG/1
1 CAPSULE WITH EVENING MEAL CAPSULE, DELAYED RELEASE ORAL ONCE A DAY
Qty: 30 CAPSULE | Refills: 5 | OUTPATIENT

## 2020-12-23 RX ORDER — SERTRALINE 100 MG/1
TAKE 1 TABLET DAILY AS DIRECTED TABLET, FILM COATED ORAL
Refills: 0 | Status: ACTIVE | COMMUNITY

## 2020-12-23 NOTE — HPI-TODAY'S VISIT:
This is a 70-year-old female with a history of fibromyalgia, IBS, diabetes, and recurrent C. difficile presenting for follow-up regarding diverticulitis.   She was last seen 10/28/2020.  She had experienced recurrent C. difficile infections 3 times over the last year.  She was taking daily probiotics.  Since July, she had required another course of antibiotics for C. difficile.  She was doing well.  She denied diarrhea or abdominal pain.  She was instructed to continue daily probiotics and to check a stool for C. difficile if diarrhea recurred.  She reports onset of intense pain indicated a left lower quadrant on 12/9/20.  She went to the emergency department at [facility] where labs and a CT scan were performed.  She was diagnosed with urinary tract infection and diverticulitis.  She was prescribed 2 different antibiotics, one of which was metronidazole.  She completed therapy.    She reports improvement of pain.  She has intermittent, sporadic soreness and discomfort in the left lower quadrant.  She is taking dicyclomine as needed reporting that it is somewhat effective.  She is having 2-3 bowel movements per day which is her baseline stool frequency.  Her stool consistency is "forming."  She denies diarrhea.  She is taking famotidine at bedtime.  2 or 3 nights a week, she is experiencing heartburn and reflux.  She has occasional nausea.  She denies any other abdominal symptoms.  She has tried taking fiber in the past reports it causes abdominal pain.  She is complaining of fatigue and low energy.  She is taking a probiotic daily as well as a multivitamin contains a probiotic.

## 2021-02-18 ENCOUNTER — TELEPHONE ENCOUNTER (OUTPATIENT)
Dept: URBAN - METROPOLITAN AREA CLINIC 113 | Facility: CLINIC | Age: 71
End: 2021-02-18

## 2021-02-18 RX ORDER — OMEPRAZOLE 40 MG/1
1 CAPSULE WITH EVENING MEAL CAPSULE, DELAYED RELEASE ORAL ONCE A DAY
Qty: 30 CAPSULE | Refills: 5

## 2021-02-19 ENCOUNTER — TELEPHONE ENCOUNTER (OUTPATIENT)
Dept: URBAN - METROPOLITAN AREA CLINIC 113 | Facility: CLINIC | Age: 71
End: 2021-02-19

## 2021-02-19 RX ORDER — AMOXICILLIN AND CLAVULANATE POTASSIUM 875; 125 MG/1; MG/1
1 TABLET TABLET, FILM COATED ORAL
Qty: 28 TABLET | Refills: 1 | OUTPATIENT
Start: 2021-02-22 | End: 2021-03-22

## 2021-02-22 ENCOUNTER — TELEPHONE ENCOUNTER (OUTPATIENT)
Dept: URBAN - METROPOLITAN AREA CLINIC 113 | Facility: CLINIC | Age: 71
End: 2021-02-22

## 2021-02-22 RX ORDER — CIPROFLOXACIN 500 MG/1
1 TABLET TABLET, FILM COATED ORAL
Qty: 28 TABLET | Refills: 1 | OUTPATIENT
Start: 2021-02-22 | End: 2021-03-22

## 2021-02-22 RX ORDER — METRONIDAZOLE 500 MG/1
1 TABLET TABLET, FILM COATED ORAL THREE TIMES A DAY
Qty: 42 TABLET | Refills: 1 | OUTPATIENT
Start: 2021-02-22 | End: 2021-03-22

## 2021-02-23 ENCOUNTER — TELEPHONE ENCOUNTER (OUTPATIENT)
Dept: URBAN - METROPOLITAN AREA CLINIC 113 | Facility: CLINIC | Age: 71
End: 2021-02-23

## 2021-02-25 ENCOUNTER — OFFICE VISIT (OUTPATIENT)
Dept: URBAN - METROPOLITAN AREA CLINIC 113 | Facility: CLINIC | Age: 71
End: 2021-02-25
Payer: MEDICARE

## 2021-02-25 VITALS
HEART RATE: 89 BPM | HEIGHT: 59 IN | TEMPERATURE: 98.2 F | BODY MASS INDEX: 30.04 KG/M2 | SYSTOLIC BLOOD PRESSURE: 118 MMHG | DIASTOLIC BLOOD PRESSURE: 71 MMHG | WEIGHT: 149 LBS

## 2021-02-25 DIAGNOSIS — K21.9 GASTROESOPHAGEAL REFLUX DISEASE, UNSPECIFIED WHETHER ESOPHAGITIS PRESENT: ICD-10-CM

## 2021-02-25 DIAGNOSIS — R10.32 LEFT LOWER QUADRANT ABDOMINAL PAIN: ICD-10-CM

## 2021-02-25 DIAGNOSIS — K57.52 DIVERTICULITIS OF BOTH SMALL AND LARGE INTESTINE: ICD-10-CM

## 2021-02-25 PROCEDURE — 99214 OFFICE O/P EST MOD 30 MIN: CPT | Performed by: INTERNAL MEDICINE

## 2021-02-25 RX ORDER — INFLUENZA A VIRUS A/VICTORIA/2570/2019 IVR-215 (H1N1) ANTIGEN (FORMALDEHYDE INACTIVATED), INFLUENZA A VIRUS A/DARWIN/9/2021 SAN-010 (H3N2) ANTIGEN (FORMALDEHYDE INACTIVATED), INFLUENZA B VIRUS B/PHUKET/3073/2013 ANTIGEN (FORMALDEHYDE INACTIVATED), AND INFLUENZA B VIRUS B/MICHIGAN/01/2021 ANTIGEN (FORMALDEHYDE INACTIVATED) 60; 60; 60; 60 UG/.7ML; UG/.7ML; UG/.7ML; UG/.7ML
AS DIRECTED INJECTION, SUSPENSION INTRAMUSCULAR
Status: ACTIVE | COMMUNITY

## 2021-02-25 RX ORDER — CIPROFLOXACIN 500 MG/1
1 TABLET TABLET, FILM COATED ORAL
Qty: 28 TABLET | Refills: 1 | Status: ON HOLD | COMMUNITY
Start: 2021-02-22 | End: 2021-03-22

## 2021-02-25 RX ORDER — TAMSULOSIN HYDROCHLORIDE 0.4 MG/1
1 CAPSULE CAPSULE ORAL ONCE A DAY
Status: ACTIVE | COMMUNITY

## 2021-02-25 RX ORDER — POLYETHYLENE GLYCOL 3350 17 G/17G
AS DIRECTED POWDER, FOR SOLUTION ORAL
Status: ACTIVE | COMMUNITY

## 2021-02-25 RX ORDER — EVOLOCUMAB 140 MG/ML
INJECTION, SOLUTION SUBCUTANEOUS
Qty: 2 | Refills: 0 | Status: ACTIVE | COMMUNITY
Start: 2020-05-15

## 2021-02-25 RX ORDER — AZELASTINE HYDROCHLORIDE AND FLUTICASONE PROPIONATE 137; 50 UG/1; UG/1
1 SPRAY IN EACH NOSTRIL SPRAY, METERED NASAL
Status: ACTIVE | COMMUNITY

## 2021-02-25 RX ORDER — DICYCLOMINE HYDROCHLORIDE 10 MG/1
TAKE 1 CAPSULE BY MOUTH EVERY 4 TO 6 HOURS AS NEEDED FOR ABDOMINAL PAIN CAPSULE ORAL
Qty: 60 | Refills: 2 | Status: ACTIVE | COMMUNITY
Start: 2020-04-13

## 2021-02-25 RX ORDER — AMOXICILLIN AND CLAVULANATE POTASSIUM 875; 125 MG/1; MG/1
1 TABLET TABLET, FILM COATED ORAL
Qty: 28 TABLET | Refills: 1 | Status: ON HOLD | COMMUNITY
Start: 2021-02-22 | End: 2021-03-22

## 2021-02-25 RX ORDER — TOPIRAMATE 100 MG/1
TAKE 1 CAPSULE BEDTIME CAPSULE, EXTENDED RELEASE ORAL
Refills: 0 | Status: ACTIVE | COMMUNITY

## 2021-02-25 RX ORDER — CIPROFLOXACIN 500 MG/1
1 TABLET TABLET, FILM COATED ORAL
Qty: 28 TABLET | Refills: 1
Start: 2021-02-22

## 2021-02-25 RX ORDER — ACETAMINOPHEN 325 MG
1 TABLET 500 MG AS NEEDED TABLET ORAL
Status: ACTIVE | COMMUNITY

## 2021-02-25 RX ORDER — MECLIZINE HYDROCHLORIDE 12.5 MG/1
TAKE 1-2 TABLETS DAILY AS DIRECTED TABLET ORAL
Refills: 0 | Status: ACTIVE | COMMUNITY

## 2021-02-25 RX ORDER — GABAPENTIN 100 MG/1
1 TABLET CAPSULE ORAL TWICE A DAY
Refills: 0 | Status: ACTIVE | COMMUNITY
Start: 2018-08-30

## 2021-02-25 RX ORDER — ICOSAPENT ETHYL 500 MG/1
4 CAPSULES WITH MEALS CAPSULE ORAL TWICE A DAY
Status: ACTIVE | COMMUNITY

## 2021-02-25 RX ORDER — ALPRAZOLAM 1 MG/1
TAKE 1 TABLET 4 TIMES DAILY TABLET ORAL
Refills: 0 | Status: ACTIVE | COMMUNITY
Start: 2015-02-02

## 2021-02-25 RX ORDER — SERTRALINE HYDROCHLORIDE 112 UG/1
TAKE 1 TABLET DAILY TABLET ORAL
Refills: 0 | Status: ON HOLD | COMMUNITY

## 2021-02-25 RX ORDER — TRAZODONE HYDROCHLORIDE 150 MG/1
1 TABLET AT BEDTIME TABLET ORAL ONCE A DAY
Status: ACTIVE | COMMUNITY

## 2021-02-25 RX ORDER — SERTRALINE 100 MG/1
TAKE 1 TABLET DAILY AS DIRECTED TABLET, FILM COATED ORAL
Refills: 0 | Status: ACTIVE | COMMUNITY

## 2021-02-25 RX ORDER — METRONIDAZOLE 500 MG/1
1 TABLET TABLET, FILM COATED ORAL THREE TIMES A DAY
Qty: 42 TABLET | Refills: 1 | Status: ON HOLD | COMMUNITY
Start: 2021-02-22 | End: 2021-03-22

## 2021-02-25 RX ORDER — TRAMADOL HYDROCHLORIDE 50 MG/1
1 TABLET AS NEEDED TABLET, FILM COATED ORAL
Status: ACTIVE | COMMUNITY

## 2021-02-25 RX ORDER — MONTELUKAST SODIUM 10 MG/1
1 TABLET TABLET, FILM COATED ORAL ONCE A DAY
Refills: 0 | Status: ACTIVE | COMMUNITY
Start: 2017-02-13

## 2021-02-25 RX ORDER — SULFAMETHOXAZOLE AND TRIMETHOPRIM 800; 160 MG/1; MG/1
1 TABLET TABLET ORAL TWICE A DAY
Status: ACTIVE | COMMUNITY

## 2021-02-25 RX ORDER — FAMOTIDINE 40 MG/1
TAKE 1 TABLET AT BEDTIME TABLET ORAL
Refills: 0 | Status: ACTIVE | COMMUNITY

## 2021-02-25 RX ORDER — ACETAMINOPHEN 500 MG
AS DIRECTED TABLET ORAL
Status: ACTIVE | COMMUNITY

## 2021-02-25 RX ORDER — OMEPRAZOLE 40 MG/1
1 CAPSULE WITH EVENING MEAL CAPSULE, DELAYED RELEASE ORAL ONCE A DAY
Qty: 30 CAPSULE | Refills: 5 | Status: ACTIVE | COMMUNITY

## 2021-02-25 RX ORDER — METHENAMINE HIPPURATE 1 G/1
TAKE ONE TABLET BY MOUTH TWICE DAILY TABLET ORAL
Qty: 60 | Refills: 0 | Status: ACTIVE | COMMUNITY
Start: 2019-01-04

## 2021-02-25 RX ORDER — NABUMETONE 500 MG/1
1 TABLET TABLET, FILM COATED ORAL TWICE A DAY
Status: ON HOLD | COMMUNITY

## 2021-02-25 RX ORDER — BUDESONIDE AND FORMOTEROL FUMARATE DIHYDRATE 160; 4.5 UG/1; UG/1
INHALE 2 PUFFS TWICE DAILY. RINSE MOUTH AFTER USE AEROSOL RESPIRATORY (INHALATION)
Refills: 0 | Status: ACTIVE | COMMUNITY

## 2021-02-25 RX ORDER — FEXOFENADINE HCL 180 MG/1
TAKE 1 TABLET EVERY MORNING TABLET, FILM COATED ORAL
Qty: 30 | Refills: 0 | Status: ACTIVE | COMMUNITY
Start: 2019-05-10

## 2021-02-25 RX ORDER — METRONIDAZOLE 500 MG/1
1 TABLET TABLET, FILM COATED ORAL THREE TIMES A DAY
Qty: 42 TABLET | Refills: 1
Start: 2021-02-22

## 2021-02-25 NOTE — HPI-TODAY'S VISIT:
71 y/o female presenting as a hospital ER f/u. She was seen in the ER and dx with possible diverticulitis (non-contrast ct). She has had a hx of diverticulitis in the past. She has been going to [place] to a Urologist. She just saw them last week. She was found to have multiple left kidney stones. She began to drink water and had been evidently passing some kidney stones and blood.   I did send her Abx but she did not start them. She does still have some discomfort in the Left lower quadrant.   12/23/2020 This is a 70-year-old female with a history of fibromyalgia, IBS, diabetes, and recurrent C. difficile presenting for follow-up regarding diverticulitis.   She was last seen 10/28/2020.  She had experienced recurrent C. difficile infections 3 times over the last year.  She was taking daily probiotics.  Since July, she had required another course of antibiotics for C. difficile.  She was doing well.  She denied diarrhea or abdominal pain.  She was instructed to continue daily probiotics and to check a stool for C. difficile if diarrhea recurred.  She reports onset of intense pain indicated a left lower quadrant on 12/9/20.  She went to the emergency department at [facility] where labs and a CT scan were performed.  She was diagnosed with urinary tract infection and diverticulitis.  She was prescribed 2 different antibiotics, one of which was metronidazole.  She completed therapy.    She reports improvement of pain.  She has intermittent, sporadic soreness and discomfort in the left lower quadrant.  She is taking dicyclomine as needed reporting that it is somewhat effective.  She is having 2-3 bowel movements per day which is her baseline stool frequency.  Her stool consistency is "forming."  She denies diarrhea.  She is taking famotidine at bedtime.  2 or 3 nights a week, she is experiencing heartburn and reflux.  She has occasional nausea.  She denies any other abdominal symptoms.  She has tried taking fiber in the past reports it causes abdominal pain.  She is complaining of fatigue and low energy.  She is taking a probiotic daily as well as a multivitamin contains a probiotic.

## 2021-03-01 ENCOUNTER — TELEPHONE ENCOUNTER (OUTPATIENT)
Dept: URBAN - METROPOLITAN AREA CLINIC 113 | Facility: CLINIC | Age: 71
End: 2021-03-01

## 2021-03-01 RX ORDER — OMEPRAZOLE 40 MG/1
1 CAPSULE WITH EVENING MEAL CAPSULE, DELAYED RELEASE ORAL ONCE A DAY
Qty: 90 | Refills: 4

## 2021-03-15 ENCOUNTER — TELEPHONE ENCOUNTER (OUTPATIENT)
Dept: URBAN - METROPOLITAN AREA CLINIC 113 | Facility: CLINIC | Age: 71
End: 2021-03-15

## 2021-03-16 ENCOUNTER — TELEPHONE ENCOUNTER (OUTPATIENT)
Dept: URBAN - METROPOLITAN AREA CLINIC 113 | Facility: CLINIC | Age: 71
End: 2021-03-16

## 2021-03-16 RX ORDER — METRONIDAZOLE 500 MG/1
1 TABLET TABLET, FILM COATED ORAL THREE TIMES A DAY
Qty: 42 TABLET | Refills: 1
Start: 2021-02-22

## 2021-03-16 RX ORDER — CIPROFLOXACIN 500 MG/1
1 TABLET TABLET, FILM COATED ORAL
Qty: 28 TABLET | Refills: 1
Start: 2021-02-22

## 2021-03-23 ENCOUNTER — OFFICE VISIT (OUTPATIENT)
Dept: URBAN - METROPOLITAN AREA CLINIC 113 | Facility: CLINIC | Age: 71
End: 2021-03-23

## 2021-03-25 ENCOUNTER — OFFICE VISIT (OUTPATIENT)
Dept: URBAN - METROPOLITAN AREA CLINIC 107 | Facility: CLINIC | Age: 71
End: 2021-03-25

## 2021-04-02 ENCOUNTER — TELEPHONE ENCOUNTER (OUTPATIENT)
Dept: URBAN - METROPOLITAN AREA CLINIC 113 | Facility: CLINIC | Age: 71
End: 2021-04-02

## 2021-04-05 ENCOUNTER — TELEPHONE ENCOUNTER (OUTPATIENT)
Dept: URBAN - METROPOLITAN AREA CLINIC 113 | Facility: CLINIC | Age: 71
End: 2021-04-05

## 2021-06-09 ENCOUNTER — OFFICE VISIT (OUTPATIENT)
Dept: URBAN - METROPOLITAN AREA CLINIC 107 | Facility: CLINIC | Age: 71
End: 2021-06-09
Payer: MEDICARE

## 2021-06-09 ENCOUNTER — WEB ENCOUNTER (OUTPATIENT)
Dept: URBAN - METROPOLITAN AREA CLINIC 107 | Facility: CLINIC | Age: 71
End: 2021-06-09

## 2021-06-09 VITALS
TEMPERATURE: 98.2 F | SYSTOLIC BLOOD PRESSURE: 114 MMHG | WEIGHT: 140 LBS | BODY MASS INDEX: 28.22 KG/M2 | DIASTOLIC BLOOD PRESSURE: 81 MMHG | HEART RATE: 103 BPM | HEIGHT: 59 IN

## 2021-06-09 DIAGNOSIS — A04.72 C. DIFFICILE COLITIS: ICD-10-CM

## 2021-06-09 DIAGNOSIS — K57.92 DIVERTICULITIS: ICD-10-CM

## 2021-06-09 DIAGNOSIS — A09 DIARRHEA OF INFECTIOUS ORIGIN: ICD-10-CM

## 2021-06-09 DIAGNOSIS — K21.9 GASTROESOPHAGEAL REFLUX DISEASE, UNSPECIFIED WHETHER ESOPHAGITIS PRESENT: ICD-10-CM

## 2021-06-09 DIAGNOSIS — R10.32 LEFT LOWER QUADRANT ABDOMINAL PAIN: ICD-10-CM

## 2021-06-09 PROCEDURE — 99213 OFFICE O/P EST LOW 20 MIN: CPT | Performed by: INTERNAL MEDICINE

## 2021-06-09 RX ORDER — METRONIDAZOLE 500 MG/1
1 TABLET TABLET, FILM COATED ORAL THREE TIMES A DAY
Qty: 42 TABLET | Refills: 1 | Status: ON HOLD | COMMUNITY
Start: 2021-02-22

## 2021-06-09 RX ORDER — ALPRAZOLAM 1 MG/1
TAKE 1 TABLET 4 TIMES DAILY TABLET ORAL
Refills: 0 | Status: ON HOLD | COMMUNITY
Start: 2015-02-02

## 2021-06-09 RX ORDER — NABUMETONE 500 MG/1
1 TABLET TABLET, FILM COATED ORAL TWICE A DAY
Status: ON HOLD | COMMUNITY

## 2021-06-09 RX ORDER — EVOLOCUMAB 140 MG/ML
INJECTION, SOLUTION SUBCUTANEOUS
Qty: 2 | Refills: 0 | Status: ACTIVE | COMMUNITY
Start: 2020-05-15

## 2021-06-09 RX ORDER — METHENAMINE HIPPURATE 1 G/1
TAKE ONE TABLET BY MOUTH TWICE DAILY TABLET ORAL
Qty: 60 | Refills: 0 | Status: ON HOLD | COMMUNITY
Start: 2019-01-04

## 2021-06-09 RX ORDER — OMEPRAZOLE 40 MG/1
1 CAPSULE WITH EVENING MEAL CAPSULE, DELAYED RELEASE ORAL ONCE A DAY
Qty: 90 | Refills: 4 | Status: ACTIVE | COMMUNITY

## 2021-06-09 RX ORDER — TRAMADOL HYDROCHLORIDE 50 MG/1
1 TABLET AS NEEDED TABLET, FILM COATED ORAL
Status: ACTIVE | COMMUNITY

## 2021-06-09 RX ORDER — SULFAMETHOXAZOLE AND TRIMETHOPRIM 800; 160 MG/1; MG/1
1 TABLET TABLET ORAL TWICE A DAY
Status: ON HOLD | COMMUNITY

## 2021-06-09 RX ORDER — AZELASTINE HYDROCHLORIDE AND FLUTICASONE PROPIONATE 137; 50 UG/1; UG/1
1 SPRAY IN EACH NOSTRIL SPRAY, METERED NASAL
Status: ACTIVE | COMMUNITY

## 2021-06-09 RX ORDER — FEXOFENADINE HCL 180 MG/1
TAKE 1 TABLET EVERY MORNING TABLET, FILM COATED ORAL
Qty: 30 | Refills: 0 | Status: ON HOLD | COMMUNITY
Start: 2019-05-10

## 2021-06-09 RX ORDER — SERTRALINE 100 MG/1
TAKE 1 TABLET DAILY AS DIRECTED TABLET, FILM COATED ORAL
Refills: 0 | Status: ACTIVE | COMMUNITY

## 2021-06-09 RX ORDER — TAMSULOSIN HYDROCHLORIDE 0.4 MG/1
1 CAPSULE CAPSULE ORAL ONCE A DAY
Status: ON HOLD | COMMUNITY

## 2021-06-09 RX ORDER — ICOSAPENT ETHYL 500 MG/1
4 CAPSULES WITH MEALS CAPSULE ORAL TWICE A DAY
Status: ON HOLD | COMMUNITY

## 2021-06-09 RX ORDER — GABAPENTIN 100 MG/1
1 TABLET CAPSULE ORAL TWICE A DAY
Refills: 0 | Status: ACTIVE | COMMUNITY
Start: 2018-08-30

## 2021-06-09 RX ORDER — CIPROFLOXACIN 500 MG/1
1 TABLET TABLET, FILM COATED ORAL
Qty: 28 TABLET | Refills: 1 | Status: ON HOLD | COMMUNITY
Start: 2021-02-22

## 2021-06-09 RX ORDER — ACETAMINOPHEN 500 MG
AS DIRECTED TABLET ORAL
Status: ACTIVE | COMMUNITY

## 2021-06-09 RX ORDER — FAMOTIDINE 40 MG/1
TAKE 1 TABLET AT BEDTIME TABLET ORAL
Refills: 0 | Status: ACTIVE | COMMUNITY

## 2021-06-09 RX ORDER — MECLIZINE HYDROCHLORIDE 12.5 MG/1
TAKE 1-2 TABLETS DAILY AS DIRECTED TABLET ORAL
Refills: 0 | Status: ON HOLD | COMMUNITY

## 2021-06-09 RX ORDER — DICYCLOMINE HYDROCHLORIDE 10 MG/1
TAKE 1 CAPSULE BY MOUTH EVERY 4 TO 6 HOURS AS NEEDED FOR ABDOMINAL PAIN CAPSULE ORAL
Qty: 60 | Refills: 2 | Status: ON HOLD | COMMUNITY
Start: 2020-04-13

## 2021-06-09 RX ORDER — TRAZODONE HYDROCHLORIDE 150 MG/1
1 TABLET AT BEDTIME TABLET ORAL ONCE A DAY
Status: ON HOLD | COMMUNITY

## 2021-06-09 RX ORDER — SERTRALINE HYDROCHLORIDE 112 UG/1
TAKE 1 TABLET DAILY TABLET ORAL
Refills: 0 | Status: ON HOLD | COMMUNITY

## 2021-06-09 RX ORDER — LEVOTHYROXINE SODIUM 137 UG/1
1 TABLET IN THE MORNING ON AN EMPTY STOMACH TABLET ORAL ONCE A DAY
Status: ACTIVE | COMMUNITY

## 2021-06-09 RX ORDER — MONTELUKAST SODIUM 10 MG/1
1 TABLET TABLET, FILM COATED ORAL ONCE A DAY
Refills: 0 | Status: ON HOLD | COMMUNITY
Start: 2017-02-13

## 2021-06-09 RX ORDER — INFLUENZA A VIRUS A/VICTORIA/2570/2019 IVR-215 (H1N1) ANTIGEN (FORMALDEHYDE INACTIVATED), INFLUENZA A VIRUS A/DARWIN/9/2021 SAN-010 (H3N2) ANTIGEN (FORMALDEHYDE INACTIVATED), INFLUENZA B VIRUS B/PHUKET/3073/2013 ANTIGEN (FORMALDEHYDE INACTIVATED), AND INFLUENZA B VIRUS B/MICHIGAN/01/2021 ANTIGEN (FORMALDEHYDE INACTIVATED) 60; 60; 60; 60 UG/.7ML; UG/.7ML; UG/.7ML; UG/.7ML
AS DIRECTED INJECTION, SUSPENSION INTRAMUSCULAR
Status: ON HOLD | COMMUNITY

## 2021-06-09 RX ORDER — ACETAMINOPHEN 325 MG
1 TABLET 500 MG AS NEEDED TABLET ORAL
Status: ACTIVE | COMMUNITY

## 2021-06-09 RX ORDER — TOPIRAMATE 100 MG/1
TAKE 1 CAPSULE BEDTIME CAPSULE, EXTENDED RELEASE ORAL
Refills: 0 | Status: ACTIVE | COMMUNITY

## 2021-06-09 RX ORDER — BUDESONIDE AND FORMOTEROL FUMARATE DIHYDRATE 160; 4.5 UG/1; UG/1
INHALE 2 PUFFS TWICE DAILY. RINSE MOUTH AFTER USE AEROSOL RESPIRATORY (INHALATION)
Refills: 0 | Status: ACTIVE | COMMUNITY

## 2021-06-09 RX ORDER — POLYETHYLENE GLYCOL 3350 17 G/17G
AS DIRECTED POWDER, FOR SOLUTION ORAL
Status: ACTIVE | COMMUNITY

## 2021-06-09 NOTE — HPI-TODAY'S VISIT:
70-year-old female presenting for follow-up.She was last seen in clinic in February 25, 2021.  She has had a history of recurrent diverticulitis and has been treated with antibiotics several times.  She was referred to surgery for evaluation of a possible hemicolectomy.  She had her hemicolectomy on May 4. She was found to have old mesh that had wrapped around her sigmoid colon. This was removed along with her sigmoid colon. She currently feels better.  2/25/21 71 y/o female presenting as a hospital ER f/u. She was seen in the ER and dx with possible diverticulitis (non-contrast ct). She has had a hx of diverticulitis in the past. She has been going to [place] to a Urologist. She just saw them last week. She was found to have multiple left kidney stones. She began to drink water and had been evidently passing some kidney stones and blood.   I did send her Abx but she did not start them. She does still have some discomfort in the Left lower quadrant.   12/23/2020 This is a 70-year-old female with a history of fibromyalgia, IBS, diabetes, and recurrent C. difficile presenting for follow-up regarding diverticulitis.   She was last seen 10/28/2020.  She had experienced recurrent C. difficile infections 3 times over the last year.  She was taking daily probiotics.  Since July, she had required another course of antibiotics for C. difficile.  She was doing well.  She denied diarrhea or abdominal pain.  She was instructed to continue daily probiotics and to check a stool for C. difficile if diarrhea recurred.  She reports onset of intense pain indicated a left lower quadrant on 12/9/20.  She went to the emergency department at [facility] where labs and a CT scan were performed.  She was diagnosed with urinary tract infection and diverticulitis.  She was prescribed 2 different antibiotics, one of which was metronidazole.  She completed therapy.    She reports improvement of pain.  She has intermittent, sporadic soreness and discomfort in the left lower quadrant.  She is taking dicyclomine as needed reporting that it is somewhat effective.  She is having 2-3 bowel movements per day which is her baseline stool frequency.  Her stool consistency is "forming."  She denies diarrhea.  She is taking famotidine at bedtime.  2 or 3 nights a week, she is experiencing heartburn and reflux.  She has occasional nausea.  She denies any other abdominal symptoms.  She has tried taking fiber in the past reports it causes abdominal pain.  She is complaining of fatigue and low energy.  She is taking a probiotic daily as well as a multivitamin contains a probiotic.

## 2021-10-18 ENCOUNTER — OFFICE VISIT (OUTPATIENT)
Dept: URBAN - METROPOLITAN AREA CLINIC 107 | Facility: CLINIC | Age: 71
End: 2021-10-18
Payer: MEDICARE

## 2021-10-18 VITALS
WEIGHT: 133 LBS | DIASTOLIC BLOOD PRESSURE: 95 MMHG | BODY MASS INDEX: 26.81 KG/M2 | SYSTOLIC BLOOD PRESSURE: 144 MMHG | HEIGHT: 59 IN | HEART RATE: 98 BPM | TEMPERATURE: 98.1 F | RESPIRATION RATE: 18 BRPM

## 2021-10-18 DIAGNOSIS — K21.9 GASTROESOPHAGEAL REFLUX DISEASE, UNSPECIFIED WHETHER ESOPHAGITIS PRESENT: ICD-10-CM

## 2021-10-18 DIAGNOSIS — K57.92 DIVERTICULITIS: ICD-10-CM

## 2021-10-18 DIAGNOSIS — R10.32 LEFT LOWER QUADRANT ABDOMINAL PAIN: ICD-10-CM

## 2021-10-18 DIAGNOSIS — A09 DIARRHEA OF INFECTIOUS ORIGIN: ICD-10-CM

## 2021-10-18 DIAGNOSIS — A04.72 C. DIFFICILE COLITIS: ICD-10-CM

## 2021-10-18 PROBLEM — 235595009: Status: ACTIVE | Noted: 2020-12-23

## 2021-10-18 PROBLEM — 301716002: Status: ACTIVE | Noted: 2020-12-23

## 2021-10-18 PROBLEM — 307496006: Status: ACTIVE | Noted: 2020-11-12

## 2021-10-18 PROCEDURE — 99214 OFFICE O/P EST MOD 30 MIN: CPT | Performed by: INTERNAL MEDICINE

## 2021-10-18 RX ORDER — NABUMETONE 500 MG/1
1 TABLET TABLET, FILM COATED ORAL TWICE A DAY
Status: ON HOLD | COMMUNITY

## 2021-10-18 RX ORDER — CIPROFLOXACIN 500 MG/1
1 TABLET TABLET, FILM COATED ORAL
Qty: 28 TABLET | Refills: 1 | Status: ON HOLD | COMMUNITY
Start: 2021-02-22

## 2021-10-18 RX ORDER — FEXOFENADINE HCL 180 MG/1
TAKE 1 TABLET EVERY MORNING TABLET, FILM COATED ORAL
Qty: 30 | Refills: 0 | Status: ON HOLD | COMMUNITY
Start: 2019-05-10

## 2021-10-18 RX ORDER — INFLUENZA A VIRUS A/VICTORIA/2570/2019 IVR-215 (H1N1) ANTIGEN (FORMALDEHYDE INACTIVATED), INFLUENZA A VIRUS A/DARWIN/9/2021 SAN-010 (H3N2) ANTIGEN (FORMALDEHYDE INACTIVATED), INFLUENZA B VIRUS B/PHUKET/3073/2013 ANTIGEN (FORMALDEHYDE INACTIVATED), AND INFLUENZA B VIRUS B/MICHIGAN/01/2021 ANTIGEN (FORMALDEHYDE INACTIVATED) 60; 60; 60; 60 UG/.7ML; UG/.7ML; UG/.7ML; UG/.7ML
AS DIRECTED INJECTION, SUSPENSION INTRAMUSCULAR
Status: ON HOLD | COMMUNITY

## 2021-10-18 RX ORDER — TRAZODONE HYDROCHLORIDE 150 MG/1
1 TABLET AT BEDTIME TABLET ORAL ONCE A DAY
Status: ACTIVE | COMMUNITY

## 2021-10-18 RX ORDER — SERTRALINE HYDROCHLORIDE 112 UG/1
TAKE 1 TABLET DAILY TABLET ORAL
Refills: 0 | Status: ON HOLD | COMMUNITY

## 2021-10-18 RX ORDER — METRONIDAZOLE 500 MG/1
1 TABLET TABLET, FILM COATED ORAL THREE TIMES A DAY
Qty: 42 TABLET | Refills: 1 | Status: ON HOLD | COMMUNITY
Start: 2021-02-22

## 2021-10-18 RX ORDER — LEVOTHYROXINE SODIUM 0.11 MG/1
1 TABLET IN THE MORNING ON AN EMPTY STOMACH TABLET ORAL ONCE A DAY
Status: ACTIVE | COMMUNITY

## 2021-10-18 RX ORDER — MECLIZINE HYDROCHLORIDE 12.5 MG/1
TAKE 1-2 TABLETS DAILY AS DIRECTED TABLET ORAL
Refills: 0 | Status: ON HOLD | COMMUNITY

## 2021-10-18 RX ORDER — SULFAMETHOXAZOLE AND TRIMETHOPRIM 800; 160 MG/1; MG/1
1 TABLET TABLET ORAL TWICE A DAY
Status: ON HOLD | COMMUNITY

## 2021-10-18 RX ORDER — TRAMADOL HYDROCHLORIDE 50 MG/1
1 TABLET AS NEEDED TABLET, FILM COATED ORAL
Status: ON HOLD | COMMUNITY

## 2021-10-18 RX ORDER — BUDESONIDE AND FORMOTEROL FUMARATE DIHYDRATE 160; 4.5 UG/1; UG/1
INHALE 2 PUFFS TWICE DAILY. RINSE MOUTH AFTER USE AEROSOL RESPIRATORY (INHALATION)
Refills: 0 | Status: ON HOLD | COMMUNITY

## 2021-10-18 RX ORDER — ACETAMINOPHEN 500 MG
AS DIRECTED TABLET ORAL
Status: ON HOLD | COMMUNITY

## 2021-10-18 RX ORDER — ICOSAPENT ETHYL 500 MG/1
4 CAPSULES WITH MEALS CAPSULE ORAL TWICE A DAY
Status: ON HOLD | COMMUNITY

## 2021-10-18 RX ORDER — TRAZODONE HYDROCHLORIDE 150 MG/1
1 TABLET AT BEDTIME TABLET ORAL ONCE A DAY
Status: ON HOLD | COMMUNITY

## 2021-10-18 RX ORDER — SERTRALINE 100 MG/1
TAKE 1 TABLET DAILY AS DIRECTED TABLET, FILM COATED ORAL
Refills: 0 | Status: ACTIVE | COMMUNITY

## 2021-10-18 RX ORDER — POLYETHYLENE GLYCOL 3350 17 G/17G
AS DIRECTED POWDER, FOR SOLUTION ORAL
Status: ON HOLD | COMMUNITY

## 2021-10-18 RX ORDER — EVOLOCUMAB 140 MG/ML
INJECTION, SOLUTION SUBCUTANEOUS
Qty: 2 | Refills: 0 | Status: ON HOLD | COMMUNITY
Start: 2020-05-15

## 2021-10-18 RX ORDER — FAMOTIDINE 40 MG/1
TAKE 1 TABLET AT BEDTIME TABLET ORAL
Refills: 0 | Status: ON HOLD | COMMUNITY

## 2021-10-18 RX ORDER — DICYCLOMINE HYDROCHLORIDE 10 MG/1
TAKE 1 CAPSULE BY MOUTH EVERY 4 TO 6 HOURS AS NEEDED FOR ABDOMINAL PAIN CAPSULE ORAL
Qty: 60 | Refills: 2 | Status: ON HOLD | COMMUNITY
Start: 2020-04-13

## 2021-10-18 RX ORDER — OMEPRAZOLE 40 MG/1
1 CAPSULE WITH EVENING MEAL CAPSULE, DELAYED RELEASE ORAL ONCE A DAY
Qty: 90 | Refills: 4 | Status: ACTIVE | COMMUNITY

## 2021-10-18 RX ORDER — MONTELUKAST SODIUM 10 MG/1
1 TABLET TABLET, FILM COATED ORAL ONCE A DAY
Refills: 0 | Status: ON HOLD | COMMUNITY
Start: 2017-02-13

## 2021-10-18 RX ORDER — TOPIRAMATE 100 MG/1
TAKE 1 CAPSULE BEDTIME CAPSULE, EXTENDED RELEASE ORAL
Refills: 0 | Status: ACTIVE | COMMUNITY

## 2021-10-18 RX ORDER — AZELASTINE HYDROCHLORIDE AND FLUTICASONE PROPIONATE 137; 50 UG/1; UG/1
1 SPRAY IN EACH NOSTRIL SPRAY, METERED NASAL
Status: ON HOLD | COMMUNITY

## 2021-10-18 RX ORDER — GABAPENTIN 100 MG/1
1 TABLET CAPSULE ORAL TWICE A DAY
Refills: 0 | Status: ACTIVE | COMMUNITY
Start: 2018-08-30

## 2021-10-18 RX ORDER — SUMATRIPTAN SUCCINATE 100 MG/1
1 TABLET AT LEAST 2 HOURS BETWEEN DOSES AS NEEDED TABLET, FILM COATED ORAL TWICE A DAY
Status: ACTIVE | COMMUNITY

## 2021-10-18 RX ORDER — METHENAMINE HIPPURATE 1 G/1
TAKE ONE TABLET BY MOUTH TWICE DAILY TABLET ORAL
Qty: 60 | Refills: 0 | Status: ACTIVE | COMMUNITY
Start: 2019-01-04

## 2021-10-18 RX ORDER — TAMSULOSIN HYDROCHLORIDE 0.4 MG/1
1 CAPSULE CAPSULE ORAL ONCE A DAY
Status: ON HOLD | COMMUNITY

## 2021-10-18 RX ORDER — ACETAMINOPHEN 325 MG
1 TABLET 500 MG AS NEEDED TABLET ORAL
Status: ON HOLD | COMMUNITY

## 2021-10-18 RX ORDER — ALPRAZOLAM 1 MG/1
TAKE 1 TABLET 4 TIMES DAILY TABLET ORAL
Refills: 0 | Status: ON HOLD | COMMUNITY
Start: 2015-02-02

## 2021-10-18 NOTE — HPI-TODAY'S VISIT:
70-year-old female presenting for follow-up.  She was last seen in clinic on June 9, 2021. She has had 1 week of diarrhea and she has tried several OTC medications. She has every few hours. She Has been going to the hospital for physical therapy over the past several weeks.  She has not been treated with any antibiotics.  She denies any rectal bleeding.  6/9/21 70-year-old female presenting for follow-up.She was last seen in clinic in February 25, 2021.  She has had a history of recurrent diverticulitis and has been treated with antibiotics several times.  She was referred to surgery for evaluation of a possible hemicolectomy.  She had her hemicolectomy on May 4. She was found to have old mesh that had wrapped around her sigmoid colon. This was removed along with her sigmoid colon. She currently feels better.  2/25/21 69 y/o female presenting as a hospital ER f/u. She was seen in the ER and dx with possible diverticulitis (non-contrast ct). She has had a hx of diverticulitis in the past. She has been going to [place] to a Urologist. She just saw them last week. She was found to have multiple left kidney stones. She began to drink water and had been evidently passing some kidney stones and blood.   I did send her Abx but she did not start them. She does still have some discomfort in the Left lower quadrant.   12/23/2020 This is a 70-year-old female with a history of fibromyalgia, IBS, diabetes, and recurrent C. difficile presenting for follow-up regarding diverticulitis.   She was last seen 10/28/2020.  She had experienced recurrent C. difficile infections 3 times over the last year.  She was taking daily probiotics.  Since July, she had required another course of antibiotics for C. difficile.  She was doing well.  She denied diarrhea or abdominal pain.  She was instructed to continue daily probiotics and to check a stool for C. difficile if diarrhea recurred.  She reports onset of intense pain indicated a left lower quadrant on 12/9/20.  She went to the emergency department at [facility] where labs and a CT scan were performed.  She was diagnosed with urinary tract infection and diverticulitis.  She was prescribed 2 different antibiotics, one of which was metronidazole.  She completed therapy.    She reports improvement of pain.  She has intermittent, sporadic soreness and discomfort in the left lower quadrant.  She is taking dicyclomine as needed reporting that it is somewhat effective.  She is having 2-3 bowel movements per day which is her baseline stool frequency.  Her stool consistency is "forming."  She denies diarrhea.  She is taking famotidine at bedtime.  2 or 3 nights a week, she is experiencing heartburn and reflux.  She has occasional nausea.  She denies any other abdominal symptoms.  She has tried taking fiber in the past reports it causes abdominal pain.  She is complaining of fatigue and low energy.  She is taking a probiotic daily as well as a multivitamin contains a probiotic. 69 y/o female presenting as a hospital ER f/u. She was seen in the ER and dx with possible diverticulitis (non-contrast ct). She has had a hx of diverticulitis in the past. She has been going to [place] to a Urologist. She just saw them last week. She was found to have multiple left kidney stones. She began to drink water and had been evidently passing some kidney stones and blood.   I did send her Abx but she did not start them. She does still have some discomfort in the Left lower quadrant.   12/23/2020 This is a 70-year-old female with a history of fibromyalgia, IBS, diabetes, and recurrent C. difficile presenting for follow-up regarding diverticulitis.   She was last seen 10/28/2020.  She had experienced recurrent C. difficile infections 3 times over the last year.  She was taking daily probiotics.  Since July, she had required another course of antibiotics for C. difficile.  She was doing well.  She denied diarrhea or abdominal pain.  She was instructed to continue daily probiotics and to check a stool for C. difficile if diarrhea recurred.  She reports onset of intense pain indicated a left lower quadrant on 12/9/20.  She went to the emergency department at [facility] where labs and a CT scan were performed.  She was diagnosed with urinary tract infection and diverticulitis.  She was prescribed 2 different antibiotics, one of which was metronidazole.  She completed therapy.    She reports improvement of pain.  She has intermittent, sporadic soreness and discomfort in the left lower quadrant.  She is taking dicyclomine as needed reporting that it is somewhat effective.  She is having 2-3 bowel movements per day which is her baseline stool frequency.  Her stool consistency is "forming."  She denies diarrhea.  She is taking famotidine at bedtime.  2 or 3 nights a week, she is experiencing heartburn and reflux.  She has occasional nausea.  She denies any other abdominal symptoms.  She has tried taking fiber in the past reports it causes abdominal pain.  She is complaining of fatigue and low energy.  She is taking a probiotic daily as well as a multivitamin contains a probiotic.

## 2021-10-18 NOTE — HPI-OTHER HISTORIES
Colonoscopy 4/20/2020 : Diverticulosis, hemorrhoids, random biopsies negative. Colonoscopy 4/20/2020 : Diverticulosis, hemorrhoids, random biopsies negative.

## 2021-10-19 ENCOUNTER — TELEPHONE ENCOUNTER (OUTPATIENT)
Dept: URBAN - METROPOLITAN AREA CLINIC 113 | Facility: CLINIC | Age: 71
End: 2021-10-19

## 2021-10-19 RX ORDER — ACETAMINOPHEN 325 MG
1 TABLET 500 MG AS NEEDED TABLET ORAL
Status: ON HOLD | COMMUNITY

## 2021-10-19 RX ORDER — LEVOTHYROXINE SODIUM 0.11 MG/1
1 TABLET IN THE MORNING ON AN EMPTY STOMACH TABLET ORAL ONCE A DAY
Status: ACTIVE | COMMUNITY

## 2021-10-19 RX ORDER — FIDAXOMICIN 200 MG/1
1 TABLET TABLET, FILM COATED ORAL TWICE A DAY
Qty: 20 TABLET | Refills: 0 | OUTPATIENT

## 2021-10-19 RX ORDER — METHENAMINE HIPPURATE 1 G/1
TAKE ONE TABLET BY MOUTH TWICE DAILY TABLET ORAL
Qty: 60 | Refills: 0 | Status: ACTIVE | COMMUNITY
Start: 2019-01-04

## 2021-10-19 RX ORDER — DICYCLOMINE HYDROCHLORIDE 10 MG/1
TAKE 1 CAPSULE BY MOUTH EVERY 4 TO 6 HOURS AS NEEDED FOR ABDOMINAL PAIN CAPSULE ORAL
Qty: 60 | Refills: 2 | Status: ON HOLD | COMMUNITY
Start: 2020-04-13

## 2021-10-19 RX ORDER — TOPIRAMATE 100 MG/1
TAKE 1 CAPSULE BEDTIME CAPSULE, EXTENDED RELEASE ORAL
Refills: 0 | Status: ACTIVE | COMMUNITY

## 2021-10-19 RX ORDER — FEXOFENADINE HCL 180 MG/1
TAKE 1 TABLET EVERY MORNING TABLET, FILM COATED ORAL
Qty: 30 | Refills: 0 | Status: ON HOLD | COMMUNITY
Start: 2019-05-10

## 2021-10-19 RX ORDER — SERTRALINE 100 MG/1
TAKE 1 TABLET DAILY AS DIRECTED TABLET, FILM COATED ORAL
Refills: 0 | Status: ACTIVE | COMMUNITY

## 2021-10-19 RX ORDER — NABUMETONE 500 MG/1
1 TABLET TABLET, FILM COATED ORAL TWICE A DAY
Status: ON HOLD | COMMUNITY

## 2021-10-19 RX ORDER — SERTRALINE HYDROCHLORIDE 112 UG/1
TAKE 1 TABLET DAILY TABLET ORAL
Refills: 0 | Status: ON HOLD | COMMUNITY

## 2021-10-19 RX ORDER — POLYETHYLENE GLYCOL 3350 17 G/17G
AS DIRECTED POWDER, FOR SOLUTION ORAL
Status: ON HOLD | COMMUNITY

## 2021-10-19 RX ORDER — EVOLOCUMAB 140 MG/ML
INJECTION, SOLUTION SUBCUTANEOUS
Qty: 2 | Refills: 0 | Status: ON HOLD | COMMUNITY
Start: 2020-05-15

## 2021-10-19 RX ORDER — MONTELUKAST SODIUM 10 MG/1
1 TABLET TABLET, FILM COATED ORAL ONCE A DAY
Refills: 0 | Status: ON HOLD | COMMUNITY
Start: 2017-02-13

## 2021-10-19 RX ORDER — TRAZODONE HYDROCHLORIDE 150 MG/1
1 TABLET AT BEDTIME TABLET ORAL ONCE A DAY
Status: ACTIVE | COMMUNITY

## 2021-10-19 RX ORDER — ICOSAPENT ETHYL 500 MG/1
4 CAPSULES WITH MEALS CAPSULE ORAL TWICE A DAY
Status: ON HOLD | COMMUNITY

## 2021-10-19 RX ORDER — GABAPENTIN 100 MG/1
1 TABLET CAPSULE ORAL TWICE A DAY
Refills: 0 | Status: ACTIVE | COMMUNITY
Start: 2018-08-30

## 2021-10-19 RX ORDER — METRONIDAZOLE 500 MG/1
1 TABLET TABLET, FILM COATED ORAL THREE TIMES A DAY
Qty: 42 TABLET | Refills: 1 | Status: ON HOLD | COMMUNITY
Start: 2021-02-22

## 2021-10-19 RX ORDER — TRAZODONE HYDROCHLORIDE 150 MG/1
1 TABLET AT BEDTIME TABLET ORAL ONCE A DAY
Status: ON HOLD | COMMUNITY

## 2021-10-19 RX ORDER — TAMSULOSIN HYDROCHLORIDE 0.4 MG/1
1 CAPSULE CAPSULE ORAL ONCE A DAY
Status: ON HOLD | COMMUNITY

## 2021-10-19 RX ORDER — ACETAMINOPHEN 500 MG
AS DIRECTED TABLET ORAL
Status: ON HOLD | COMMUNITY

## 2021-10-19 RX ORDER — BUDESONIDE AND FORMOTEROL FUMARATE DIHYDRATE 160; 4.5 UG/1; UG/1
INHALE 2 PUFFS TWICE DAILY. RINSE MOUTH AFTER USE AEROSOL RESPIRATORY (INHALATION)
Refills: 0 | Status: ON HOLD | COMMUNITY

## 2021-10-19 RX ORDER — SULFAMETHOXAZOLE AND TRIMETHOPRIM 800; 160 MG/1; MG/1
1 TABLET TABLET ORAL TWICE A DAY
Status: ON HOLD | COMMUNITY

## 2021-10-19 RX ORDER — MECLIZINE HYDROCHLORIDE 12.5 MG/1
TAKE 1-2 TABLETS DAILY AS DIRECTED TABLET ORAL
Refills: 0 | Status: ON HOLD | COMMUNITY

## 2021-10-19 RX ORDER — BEZLOTOXUMAB 25 MG/ML
INFUSE 10 MG/KG OVER 60 MINUTES INJECTION, SOLUTION INTRAVENOUS ONCE
Qty: 1 VIAL | Refills: 0 | OUTPATIENT
Start: 2021-10-19 | End: 2021-10-20

## 2021-10-19 RX ORDER — OMEPRAZOLE 40 MG/1
1 CAPSULE WITH EVENING MEAL CAPSULE, DELAYED RELEASE ORAL ONCE A DAY
Qty: 90 | Refills: 4 | Status: ACTIVE | COMMUNITY

## 2021-10-19 RX ORDER — CIPROFLOXACIN 500 MG/1
1 TABLET TABLET, FILM COATED ORAL
Qty: 28 TABLET | Refills: 1 | Status: ON HOLD | COMMUNITY
Start: 2021-02-22

## 2021-10-19 RX ORDER — TRAMADOL HYDROCHLORIDE 50 MG/1
1 TABLET AS NEEDED TABLET, FILM COATED ORAL
Status: ON HOLD | COMMUNITY

## 2021-10-19 RX ORDER — FAMOTIDINE 40 MG/1
TAKE 1 TABLET AT BEDTIME TABLET ORAL
Refills: 0 | Status: ON HOLD | COMMUNITY

## 2021-10-19 RX ORDER — ALPRAZOLAM 1 MG/1
TAKE 1 TABLET 4 TIMES DAILY TABLET ORAL
Refills: 0 | Status: ON HOLD | COMMUNITY
Start: 2015-02-02

## 2021-10-19 RX ORDER — AZELASTINE HYDROCHLORIDE AND FLUTICASONE PROPIONATE 137; 50 UG/1; UG/1
1 SPRAY IN EACH NOSTRIL SPRAY, METERED NASAL
Status: ON HOLD | COMMUNITY

## 2021-10-19 RX ORDER — SUMATRIPTAN SUCCINATE 100 MG/1
1 TABLET AT LEAST 2 HOURS BETWEEN DOSES AS NEEDED TABLET, FILM COATED ORAL TWICE A DAY
Status: ACTIVE | COMMUNITY

## 2021-10-19 RX ORDER — INFLUENZA A VIRUS A/VICTORIA/2570/2019 IVR-215 (H1N1) ANTIGEN (FORMALDEHYDE INACTIVATED), INFLUENZA A VIRUS A/DARWIN/9/2021 SAN-010 (H3N2) ANTIGEN (FORMALDEHYDE INACTIVATED), INFLUENZA B VIRUS B/PHUKET/3073/2013 ANTIGEN (FORMALDEHYDE INACTIVATED), AND INFLUENZA B VIRUS B/MICHIGAN/01/2021 ANTIGEN (FORMALDEHYDE INACTIVATED) 60; 60; 60; 60 UG/.7ML; UG/.7ML; UG/.7ML; UG/.7ML
AS DIRECTED INJECTION, SUSPENSION INTRAMUSCULAR
Status: ON HOLD | COMMUNITY

## 2021-10-22 ENCOUNTER — TELEPHONE ENCOUNTER (OUTPATIENT)
Dept: URBAN - METROPOLITAN AREA CLINIC 113 | Facility: CLINIC | Age: 71
End: 2021-10-22

## 2021-10-22 PROBLEM — 423590009: Status: ACTIVE | Noted: 2020-10-28

## 2021-10-22 RX ORDER — BEZLOTOXUMAB 25 MG/ML
AS DIRECTED INJECTION, SOLUTION INTRAVENOUS
OUTPATIENT
Start: 2021-10-22

## 2021-10-22 RX ORDER — FIDAXOMICIN 200 MG/1
1 TABLET TABLET, FILM COATED ORAL TWICE A DAY
OUTPATIENT

## 2021-10-28 ENCOUNTER — TELEPHONE ENCOUNTER (OUTPATIENT)
Dept: URBAN - METROPOLITAN AREA CLINIC 113 | Facility: CLINIC | Age: 71
End: 2021-10-28

## 2022-08-11 ENCOUNTER — TELEPHONE ENCOUNTER (OUTPATIENT)
Dept: URBAN - METROPOLITAN AREA CLINIC 113 | Facility: CLINIC | Age: 72
End: 2022-08-11

## 2022-08-12 ENCOUNTER — OFFICE VISIT (OUTPATIENT)
Dept: URBAN - METROPOLITAN AREA CLINIC 107 | Facility: CLINIC | Age: 72
End: 2022-08-12
Payer: MEDICARE

## 2022-08-12 VITALS
HEIGHT: 59 IN | SYSTOLIC BLOOD PRESSURE: 143 MMHG | DIASTOLIC BLOOD PRESSURE: 84 MMHG | WEIGHT: 142.2 LBS | BODY MASS INDEX: 28.67 KG/M2 | HEART RATE: 94 BPM | TEMPERATURE: 98 F

## 2022-08-12 DIAGNOSIS — Z87.19 HISTORY OF DIVERTICULITIS: ICD-10-CM

## 2022-08-12 DIAGNOSIS — R15.9 FULL INCONTINENCE OF FECES: ICD-10-CM

## 2022-08-12 DIAGNOSIS — Z86.19 HISTORY OF CLOSTRIDIOIDES DIFFICILE COLITIS: ICD-10-CM

## 2022-08-12 DIAGNOSIS — R10.13 EPIGASTRIC PAIN: ICD-10-CM

## 2022-08-12 DIAGNOSIS — K52.9 CHRONIC DIARRHEA: ICD-10-CM

## 2022-08-12 DIAGNOSIS — Z90.49 HISTORY OF HEMICOLECTOMY: ICD-10-CM

## 2022-08-12 PROBLEM — 1086911000119107: Status: ACTIVE | Noted: 2022-08-12

## 2022-08-12 PROBLEM — 428305005: Status: ACTIVE | Noted: 2022-08-12

## 2022-08-12 PROCEDURE — 99214 OFFICE O/P EST MOD 30 MIN: CPT

## 2022-08-12 RX ORDER — FIDAXOMICIN 200 MG/1
1 TABLET TABLET, FILM COATED ORAL TWICE A DAY
Status: ON HOLD | COMMUNITY

## 2022-08-12 RX ORDER — LEVOTHYROXINE SODIUM 0.11 MG/1
1 TABLET IN THE MORNING ON AN EMPTY STOMACH TABLET ORAL ONCE A DAY
Status: ON HOLD | COMMUNITY

## 2022-08-12 RX ORDER — SUCRALFATE 1 G
1 TABLET ON AN EMPTY STOMACH TABLET ORAL TWICE A DAY
Qty: 60 | Refills: 3 | OUTPATIENT
Start: 2022-08-12 | End: 2022-12-09

## 2022-08-12 RX ORDER — METHENAMINE HIPPURATE 1 G/1
TAKE ONE TABLET BY MOUTH TWICE DAILY TABLET ORAL
Qty: 60 | Refills: 0 | Status: ON HOLD | COMMUNITY
Start: 2019-01-04

## 2022-08-12 RX ORDER — NABUMETONE 500 MG/1
1 TABLET TABLET, FILM COATED ORAL TWICE A DAY
Status: ON HOLD | COMMUNITY

## 2022-08-12 RX ORDER — MONTELUKAST SODIUM 10 MG/1
1 TABLET TABLET, FILM COATED ORAL ONCE A DAY
Refills: 0 | Status: ON HOLD | COMMUNITY
Start: 2017-02-13

## 2022-08-12 RX ORDER — COLESEVELAM HYDROCHLORIDE 625 MG/1
1 TABLET TABLET, COATED ORAL TWICE A DAY
Qty: 60 TABLET | Refills: 2 | OUTPATIENT
Start: 2022-08-12

## 2022-08-12 RX ORDER — TOPIRAMATE 100 MG/1
TAKE 1 CAPSULE BEDTIME CAPSULE, EXTENDED RELEASE ORAL
Refills: 0 | Status: ON HOLD | COMMUNITY

## 2022-08-12 RX ORDER — ACETAMINOPHEN 325 MG
1 TABLET 500 MG AS NEEDED TABLET ORAL
Status: ON HOLD | COMMUNITY

## 2022-08-12 RX ORDER — SERTRALINE HYDROCHLORIDE 112 UG/1
TAKE 1 TABLET DAILY TABLET ORAL
Refills: 0 | Status: ON HOLD | COMMUNITY

## 2022-08-12 RX ORDER — MECLIZINE HYDROCHLORIDE 12.5 MG/1
TAKE 1-2 TABLETS DAILY AS DIRECTED TABLET ORAL
Refills: 0 | Status: ON HOLD | COMMUNITY

## 2022-08-12 RX ORDER — TOPIRAMATE 50 MG/1
1 TABLET TABLET, FILM COATED ORAL ONCE A DAY
Status: ACTIVE | COMMUNITY

## 2022-08-12 RX ORDER — DICYCLOMINE HYDROCHLORIDE 10 MG/1
TAKE 1 CAPSULE BY MOUTH EVERY 4 TO 6 HOURS AS NEEDED FOR ABDOMINAL PAIN CAPSULE ORAL
Qty: 60 | Refills: 2 | Status: ON HOLD | COMMUNITY
Start: 2020-04-13

## 2022-08-12 RX ORDER — ALPRAZOLAM 1 MG/1
TAKE 1 TABLET 4 TIMES DAILY TABLET ORAL
Refills: 0 | Status: ON HOLD | COMMUNITY
Start: 2015-02-02

## 2022-08-12 RX ORDER — ACETAMINOPHEN 500 MG
AS DIRECTED TABLET ORAL
Status: ON HOLD | COMMUNITY

## 2022-08-12 RX ORDER — BUDESONIDE AND FORMOTEROL FUMARATE DIHYDRATE 160; 4.5 UG/1; UG/1
INHALE 2 PUFFS TWICE DAILY. RINSE MOUTH AFTER USE AEROSOL RESPIRATORY (INHALATION)
Refills: 0 | Status: ON HOLD | COMMUNITY

## 2022-08-12 RX ORDER — SUMATRIPTAN SUCCINATE 100 MG/1
1 TABLET AT LEAST 2 HOURS BETWEEN DOSES AS NEEDED TABLET, FILM COATED ORAL TWICE A DAY
Status: ACTIVE | COMMUNITY

## 2022-08-12 RX ORDER — ICOSAPENT ETHYL 500 MG/1
4 CAPSULES WITH MEALS CAPSULE ORAL TWICE A DAY
Status: ON HOLD | COMMUNITY

## 2022-08-12 RX ORDER — GABAPENTIN 100 MG/1
1 TABLET CAPSULE ORAL TWICE A DAY
Refills: 0 | Status: ON HOLD | COMMUNITY
Start: 2018-08-30

## 2022-08-12 RX ORDER — METRONIDAZOLE 500 MG/1
1 TABLET TABLET, FILM COATED ORAL THREE TIMES A DAY
Qty: 42 TABLET | Refills: 1 | Status: ON HOLD | COMMUNITY
Start: 2021-02-22

## 2022-08-12 RX ORDER — TAMSULOSIN HYDROCHLORIDE 0.4 MG/1
1 CAPSULE CAPSULE ORAL ONCE A DAY
Status: ON HOLD | COMMUNITY

## 2022-08-12 RX ORDER — SULFAMETHOXAZOLE AND TRIMETHOPRIM 800; 160 MG/1; MG/1
1 TABLET TABLET ORAL TWICE A DAY
Status: ON HOLD | COMMUNITY

## 2022-08-12 RX ORDER — EPTINEZUMAB-JJMR 100 MG/ML
AS DIRECTED INJECTION INTRAVENOUS
Status: ACTIVE | COMMUNITY

## 2022-08-12 RX ORDER — TRAZODONE HYDROCHLORIDE 150 MG/1
1 TABLET AT BEDTIME TABLET ORAL ONCE A DAY
Status: ON HOLD | COMMUNITY

## 2022-08-12 RX ORDER — OMEPRAZOLE 40 MG/1
1 CAPSULE WITH EVENING MEAL CAPSULE, DELAYED RELEASE ORAL ONCE A DAY
Qty: 90 | Refills: 4 | Status: ON HOLD | COMMUNITY

## 2022-08-12 RX ORDER — TRAMADOL HYDROCHLORIDE 50 MG/1
1 TABLET AS NEEDED TABLET, FILM COATED ORAL
Status: ON HOLD | COMMUNITY

## 2022-08-12 RX ORDER — BEZLOTOXUMAB 25 MG/ML
AS DIRECTED INJECTION, SOLUTION INTRAVENOUS
Status: ON HOLD | COMMUNITY
Start: 2021-10-22

## 2022-08-12 RX ORDER — POLYETHYLENE GLYCOL 3350 17 G/17G
AS DIRECTED POWDER, FOR SOLUTION ORAL
Status: ON HOLD | COMMUNITY

## 2022-08-12 RX ORDER — INFLUENZA A VIRUS A/VICTORIA/2570/2019 IVR-215 (H1N1) ANTIGEN (FORMALDEHYDE INACTIVATED), INFLUENZA A VIRUS A/DARWIN/9/2021 SAN-010 (H3N2) ANTIGEN (FORMALDEHYDE INACTIVATED), INFLUENZA B VIRUS B/PHUKET/3073/2013 ANTIGEN (FORMALDEHYDE INACTIVATED), AND INFLUENZA B VIRUS B/MICHIGAN/01/2021 ANTIGEN (FORMALDEHYDE INACTIVATED) 60; 60; 60; 60 UG/.7ML; UG/.7ML; UG/.7ML; UG/.7ML
AS DIRECTED INJECTION, SUSPENSION INTRAMUSCULAR
Status: ON HOLD | COMMUNITY

## 2022-08-12 RX ORDER — CIPROFLOXACIN 500 MG/1
1 TABLET TABLET, FILM COATED ORAL
Qty: 28 TABLET | Refills: 1 | Status: ON HOLD | COMMUNITY
Start: 2021-02-22

## 2022-08-12 RX ORDER — SERTRALINE 100 MG/1
TAKE 1 TABLET DAILY AS DIRECTED TABLET, FILM COATED ORAL
Refills: 0 | Status: ACTIVE | COMMUNITY

## 2022-08-12 RX ORDER — TRAZODONE HYDROCHLORIDE 150 MG/1
1 TABLET AT BEDTIME TABLET ORAL ONCE A DAY
Status: ACTIVE | COMMUNITY

## 2022-08-12 RX ORDER — EVOLOCUMAB 140 MG/ML
INJECTION, SOLUTION SUBCUTANEOUS
Qty: 2 | Refills: 0 | Status: ACTIVE | COMMUNITY
Start: 2020-05-15

## 2022-08-12 RX ORDER — FAMOTIDINE 40 MG/1
TAKE 1 TABLET AT BEDTIME TABLET ORAL
Refills: 0 | Status: ON HOLD | COMMUNITY

## 2022-08-12 RX ORDER — AZELASTINE HYDROCHLORIDE AND FLUTICASONE PROPIONATE 137; 50 UG/1; UG/1
1 SPRAY IN EACH NOSTRIL SPRAY, METERED NASAL
Status: ON HOLD | COMMUNITY

## 2022-08-12 RX ORDER — FEXOFENADINE HCL 180 MG/1
TAKE 1 TABLET EVERY MORNING TABLET, FILM COATED ORAL
Qty: 30 | Refills: 0 | Status: ON HOLD | COMMUNITY
Start: 2019-05-10

## 2022-08-12 NOTE — HPI-TODAY'S VISIT:
71 year old female with a history of C. difficile colitis s/p treatment with Zinplava, recurrent diverticulitis s/p hemicolectomy and mesh removal (May 2021),  fibromyalgia, IBS, and diabetes presents for long interval follow up and evaluation of abdominal pain. She was last seen on 10/18/2021. She reported recurrent diarrhea and was planned for stool studies. She does have a history of C. diff in the past.   C. diff was testing was positive. SHe was treated with Dificid. She was also treated with one time infusion of Zinplava for prevention of C. diff recurrence. Patient called the office on 8/11/22 complaining of abdominal discomfort for over a month.   She reports a new onset of epigastric pain that radiates to her back, ongoing for a few months. The pain worsens with meals. SHe notices her blood pressure increases when she eats due to the worsening pain. SHe denies associated nausea and vomiting. She does not take NSAIDs often. She tries to take Tylenol. She has tried milk to coat her stomach. She states her stools are "watery and slimy." SHe also admits to fecal incontinence. She denies nocturnal stools. She will have multiple soft bowel movements daily. Her stools will turn dark when she takes Pepto Bismol. Imodium and Pepto Bismol does not help though she does not take daily. She states her  has been in the hospital with double PNA. She has been in and out of the hospital visiting him. She has been stressed and overwhelmed with her life right now. She wonders if she has a stress ulcer. 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   10/18/2021: 70-year-old female presenting for follow-up.  She was last seen in clinic on June 9, 2021. She has had 1 week of diarrhea, and she has tried several OTC medications. She has every few hours. She Has been going to the hospital for physical therapy over the past several weeks.  She has not been treated with any antibiotics.  She denies any rectal bleeding.  6/9/21 70-year-old female presenting for follow-up.She was last seen in clinic on February 25, 2021.  She has had a history of recurrent diverticulitis and has been treated with antibiotics several times.  She was referred to surgery for evaluation of a possible hemicolectomy.  She had her hemicolectomy on May 4. She was found to have old mesh that had wrapped around her sigmoid colon. This was removed along with her sigmoid colon. She currently feels better.  2/25/21 71 y/o female presenting as a hospital ER f/u. She was seen in the ER and dx with possible diverticulitis (non-contrast ct). She has had a hx of diverticulitis in the past. She has been going to [place] to a Urologist. She just saw them last week. She was found to have multiple left kidney stones. She began to drink water and had been evidently passing some kidney stones and blood.   I did send her Abx, but she did not start them. She does still have some discomfort in the Left lower quadrant.   12/23/2020 This is a 70-year-old female with a history of fibromyalgia, IBS, diabetes, and recurrent C. difficile presenting for follow-up regarding diverticulitis.   She was last seen 10/28/2020.  She had experienced recurrent C. difficile infections 3 times over the last year.  She was taking daily probiotics.  Since July, she had required another course of antibiotics for C. difficile.  She was doing well.  She denied diarrhea or abdominal pain.  She was instructed to continue daily probiotics and to check a stool for C. difficile if diarrhea recurred.  She reports onset of intense pain indicated a left lower quadrant on 12/9/20.  She went to the emergency department at [facility] where labs and a CT scan were performed.  She was diagnosed with urinary tract infection and diverticulitis.  She was prescribed 2 different antibiotics, one of which was metronidazole.  She completed therapy.    She reports improvement of pain.  She has intermittent, sporadic soreness and discomfort in the left lower quadrant.  She is taking dicyclomine as needed reporting that it is somewhat effective.  She is having 2-3 bowel movements per day which is her baseline stool frequency.  Her stool consistency is "forming."  She denies diarrhea.  She is taking famotidine at bedtime.  2 or 3 nights a week, she is experiencing heartburn and reflux.  She has occasional nausea.  She denies any other abdominal symptoms.  She has tried taking fiber in the past reports it causes abdominal pain.  She is complaining of fatigue and low energy.  She is taking a probiotic daily as well as a multivitamin contains a probiotic. 71 y/o female presenting as a hospital ER f/u. She was seen in the ER and dx with possible diverticulitis (non-contrast ct). She has had a hx of diverticulitis in the past. She has been going to [place] to a Urologist. She just saw them last week. She was found to have multiple left kidney stones. She began to drink water and had been evidently passing some kidney stones and blood.   I did send her Abx but she did not start them. She does still have some discomfort in the Left lower quadrant.

## 2022-08-12 NOTE — PHYSICAL EXAM GASTROINTESTINAL
soft, tender to palpation of epigastric region, no guarding or rebound, nondistended , normal bowel sounds

## 2022-08-16 ENCOUNTER — TELEPHONE ENCOUNTER (OUTPATIENT)
Dept: URBAN - METROPOLITAN AREA CLINIC 113 | Facility: CLINIC | Age: 72
End: 2022-08-16

## 2022-08-16 RX ORDER — VANCOMYCIN HYDROCHLORIDE 125 MG/1
AS DIRECTED CAPSULE ORAL
Qty: 56 | Refills: 1 | OUTPATIENT
Start: 2022-08-17 | End: 2022-09-14

## 2022-08-18 ENCOUNTER — TELEPHONE ENCOUNTER (OUTPATIENT)
Dept: URBAN - METROPOLITAN AREA CLINIC 107 | Facility: CLINIC | Age: 72
End: 2022-08-18

## 2022-08-24 ENCOUNTER — OFFICE VISIT (OUTPATIENT)
Dept: URBAN - METROPOLITAN AREA MEDICAL CENTER 37 | Facility: MEDICAL CENTER | Age: 72
End: 2022-08-24
Payer: MEDICARE

## 2022-08-24 DIAGNOSIS — K29.60 ADENOPAPILLOMATOSIS GASTRICA: ICD-10-CM

## 2022-08-24 DIAGNOSIS — R10.13 ABDOMINAL DISCOMFORT, EPIGASTRIC: ICD-10-CM

## 2022-08-24 PROCEDURE — 43239 EGD BIOPSY SINGLE/MULTIPLE: CPT | Performed by: INTERNAL MEDICINE

## 2022-08-24 RX ORDER — TRAZODONE HYDROCHLORIDE 150 MG/1
1 TABLET AT BEDTIME TABLET ORAL ONCE A DAY
Status: ACTIVE | COMMUNITY

## 2022-08-24 RX ORDER — ACETAMINOPHEN 325 MG
1 TABLET 500 MG AS NEEDED TABLET ORAL
Status: ON HOLD | COMMUNITY

## 2022-08-24 RX ORDER — VANCOMYCIN HYDROCHLORIDE 125 MG/1
AS DIRECTED CAPSULE ORAL
Qty: 56 | Refills: 1 | Status: ACTIVE | COMMUNITY
Start: 2022-08-17 | End: 2022-09-14

## 2022-08-24 RX ORDER — GABAPENTIN 100 MG/1
1 TABLET CAPSULE ORAL TWICE A DAY
Refills: 0 | Status: ON HOLD | COMMUNITY
Start: 2018-08-30

## 2022-08-24 RX ORDER — SERTRALINE 100 MG/1
TAKE 1 TABLET DAILY AS DIRECTED TABLET, FILM COATED ORAL
Refills: 0 | Status: ACTIVE | COMMUNITY

## 2022-08-24 RX ORDER — EPTINEZUMAB-JJMR 100 MG/ML
AS DIRECTED INJECTION INTRAVENOUS
Status: ACTIVE | COMMUNITY

## 2022-08-24 RX ORDER — TRAZODONE HYDROCHLORIDE 150 MG/1
1 TABLET AT BEDTIME TABLET ORAL ONCE A DAY
Status: ON HOLD | COMMUNITY

## 2022-08-24 RX ORDER — CIPROFLOXACIN 500 MG/1
1 TABLET TABLET, FILM COATED ORAL
Qty: 28 TABLET | Refills: 1 | Status: ON HOLD | COMMUNITY
Start: 2021-02-22

## 2022-08-24 RX ORDER — METHENAMINE HIPPURATE 1 G/1
TAKE ONE TABLET BY MOUTH TWICE DAILY TABLET ORAL
Qty: 60 | Refills: 0 | Status: ON HOLD | COMMUNITY
Start: 2019-01-04

## 2022-08-24 RX ORDER — BUDESONIDE AND FORMOTEROL FUMARATE DIHYDRATE 160; 4.5 UG/1; UG/1
INHALE 2 PUFFS TWICE DAILY. RINSE MOUTH AFTER USE AEROSOL RESPIRATORY (INHALATION)
Refills: 0 | Status: ON HOLD | COMMUNITY

## 2022-08-24 RX ORDER — LEVOTHYROXINE SODIUM 0.11 MG/1
1 TABLET IN THE MORNING ON AN EMPTY STOMACH TABLET ORAL ONCE A DAY
Status: ON HOLD | COMMUNITY

## 2022-08-24 RX ORDER — MECLIZINE HYDROCHLORIDE 12.5 MG/1
TAKE 1-2 TABLETS DAILY AS DIRECTED TABLET ORAL
Refills: 0 | Status: ON HOLD | COMMUNITY

## 2022-08-24 RX ORDER — BEZLOTOXUMAB 25 MG/ML
AS DIRECTED INJECTION, SOLUTION INTRAVENOUS
Status: ON HOLD | COMMUNITY
Start: 2021-10-22

## 2022-08-24 RX ORDER — TRAMADOL HYDROCHLORIDE 50 MG/1
1 TABLET AS NEEDED TABLET, FILM COATED ORAL
Status: ON HOLD | COMMUNITY

## 2022-08-24 RX ORDER — SULFAMETHOXAZOLE AND TRIMETHOPRIM 800; 160 MG/1; MG/1
1 TABLET TABLET ORAL TWICE A DAY
Status: ON HOLD | COMMUNITY

## 2022-08-24 RX ORDER — TAMSULOSIN HYDROCHLORIDE 0.4 MG/1
1 CAPSULE CAPSULE ORAL ONCE A DAY
Status: ON HOLD | COMMUNITY

## 2022-08-24 RX ORDER — EVOLOCUMAB 140 MG/ML
INJECTION, SOLUTION SUBCUTANEOUS
Qty: 2 | Refills: 0 | Status: ACTIVE | COMMUNITY
Start: 2020-05-15

## 2022-08-24 RX ORDER — SUMATRIPTAN SUCCINATE 100 MG/1
1 TABLET AT LEAST 2 HOURS BETWEEN DOSES AS NEEDED TABLET, FILM COATED ORAL TWICE A DAY
Status: ACTIVE | COMMUNITY

## 2022-08-24 RX ORDER — MONTELUKAST SODIUM 10 MG/1
1 TABLET TABLET, FILM COATED ORAL ONCE A DAY
Refills: 0 | Status: ON HOLD | COMMUNITY
Start: 2017-02-13

## 2022-08-24 RX ORDER — ALPRAZOLAM 1 MG/1
TAKE 1 TABLET 4 TIMES DAILY TABLET ORAL
Refills: 0 | Status: ON HOLD | COMMUNITY
Start: 2015-02-02

## 2022-08-24 RX ORDER — INFLUENZA A VIRUS A/VICTORIA/2570/2019 IVR-215 (H1N1) ANTIGEN (FORMALDEHYDE INACTIVATED), INFLUENZA A VIRUS A/DARWIN/9/2021 SAN-010 (H3N2) ANTIGEN (FORMALDEHYDE INACTIVATED), INFLUENZA B VIRUS B/PHUKET/3073/2013 ANTIGEN (FORMALDEHYDE INACTIVATED), AND INFLUENZA B VIRUS B/MICHIGAN/01/2021 ANTIGEN (FORMALDEHYDE INACTIVATED) 60; 60; 60; 60 UG/.7ML; UG/.7ML; UG/.7ML; UG/.7ML
AS DIRECTED INJECTION, SUSPENSION INTRAMUSCULAR
Status: ON HOLD | COMMUNITY

## 2022-08-24 RX ORDER — OMEPRAZOLE 40 MG/1
1 CAPSULE WITH EVENING MEAL CAPSULE, DELAYED RELEASE ORAL ONCE A DAY
Qty: 90 | Refills: 4 | Status: ON HOLD | COMMUNITY

## 2022-08-24 RX ORDER — FEXOFENADINE HCL 180 MG/1
TAKE 1 TABLET EVERY MORNING TABLET, FILM COATED ORAL
Qty: 30 | Refills: 0 | Status: ON HOLD | COMMUNITY
Start: 2019-05-10

## 2022-08-24 RX ORDER — AZELASTINE HYDROCHLORIDE AND FLUTICASONE PROPIONATE 137; 50 UG/1; UG/1
1 SPRAY IN EACH NOSTRIL SPRAY, METERED NASAL
Status: ON HOLD | COMMUNITY

## 2022-08-24 RX ORDER — FIDAXOMICIN 200 MG/1
1 TABLET TABLET, FILM COATED ORAL TWICE A DAY
Status: ON HOLD | COMMUNITY

## 2022-08-24 RX ORDER — POLYETHYLENE GLYCOL 3350 17 G/17G
AS DIRECTED POWDER, FOR SOLUTION ORAL
Status: ON HOLD | COMMUNITY

## 2022-08-24 RX ORDER — METRONIDAZOLE 500 MG/1
1 TABLET TABLET, FILM COATED ORAL THREE TIMES A DAY
Qty: 42 TABLET | Refills: 1 | Status: ON HOLD | COMMUNITY
Start: 2021-02-22

## 2022-08-24 RX ORDER — NABUMETONE 500 MG/1
1 TABLET TABLET, FILM COATED ORAL TWICE A DAY
Status: ON HOLD | COMMUNITY

## 2022-08-24 RX ORDER — FAMOTIDINE 40 MG/1
TAKE 1 TABLET AT BEDTIME TABLET ORAL
Refills: 0 | Status: ON HOLD | COMMUNITY

## 2022-08-24 RX ORDER — DICYCLOMINE HYDROCHLORIDE 10 MG/1
TAKE 1 CAPSULE BY MOUTH EVERY 4 TO 6 HOURS AS NEEDED FOR ABDOMINAL PAIN CAPSULE ORAL
Qty: 60 | Refills: 2 | Status: ON HOLD | COMMUNITY
Start: 2020-04-13

## 2022-08-24 RX ORDER — ICOSAPENT ETHYL 500 MG/1
4 CAPSULES WITH MEALS CAPSULE ORAL TWICE A DAY
Status: ON HOLD | COMMUNITY

## 2022-08-24 RX ORDER — SERTRALINE HYDROCHLORIDE 112 UG/1
TAKE 1 TABLET DAILY TABLET ORAL
Refills: 0 | Status: ON HOLD | COMMUNITY

## 2022-08-24 RX ORDER — TOPIRAMATE 50 MG/1
1 TABLET TABLET, FILM COATED ORAL ONCE A DAY
Status: ACTIVE | COMMUNITY

## 2022-08-24 RX ORDER — TOPIRAMATE 100 MG/1
TAKE 1 CAPSULE BEDTIME CAPSULE, EXTENDED RELEASE ORAL
Refills: 0 | Status: ON HOLD | COMMUNITY

## 2022-08-24 RX ORDER — COLESEVELAM HYDROCHLORIDE 625 MG/1
1 TABLET TABLET, COATED ORAL TWICE A DAY
Qty: 60 TABLET | Refills: 2 | Status: ACTIVE | COMMUNITY
Start: 2022-08-12

## 2022-08-24 RX ORDER — SUCRALFATE 1 G
1 TABLET ON AN EMPTY STOMACH TABLET ORAL TWICE A DAY
Qty: 60 | Refills: 3 | Status: ACTIVE | COMMUNITY
Start: 2022-08-12 | End: 2022-12-09

## 2022-08-24 RX ORDER — ACETAMINOPHEN 500 MG
AS DIRECTED TABLET ORAL
Status: ON HOLD | COMMUNITY

## 2022-09-06 ENCOUNTER — TELEPHONE ENCOUNTER (OUTPATIENT)
Dept: URBAN - METROPOLITAN AREA CLINIC 113 | Facility: CLINIC | Age: 72
End: 2022-09-06

## 2022-09-06 PROBLEM — 19213003: Status: ACTIVE | Noted: 2020-10-28

## 2022-09-07 ENCOUNTER — TELEPHONE ENCOUNTER (OUTPATIENT)
Dept: URBAN - METROPOLITAN AREA CLINIC 113 | Facility: CLINIC | Age: 72
End: 2022-09-07

## 2022-09-12 ENCOUNTER — TELEPHONE ENCOUNTER (OUTPATIENT)
Dept: URBAN - METROPOLITAN AREA CLINIC 107 | Facility: CLINIC | Age: 72
End: 2022-09-12

## 2022-09-23 ENCOUNTER — OFFICE VISIT (OUTPATIENT)
Dept: URBAN - METROPOLITAN AREA CLINIC 107 | Facility: CLINIC | Age: 72
End: 2022-09-23

## 2022-09-23 NOTE — HPI-TODAY'S VISIT:
71-year-old female presents for follow-up.  She was last seen on 8/12/2022.  She did report a new onset of severe, sharp epigastric pain that radiates to the back and worsens with food.  No associated nausea or vomiting.  Differential included peptic ulcer versus pancreatic pathology versus functional GI disorder versus other.  She is planned for EGD to evaluate for gastroesophageal pathology.  She was also planned for CT scan of the abdomen/pelvis to rule out pancreatitis or other acute intra-abdominal pathology.  We would obtain blood work as well.  She was started on Carafate in the interim for hopeful relief of pain.  She does have a history of recurrent C. difficile colitis status posttreatment with some plaque infusion.  She complained of chronic, watery diarrhea and fecal incontinence.  She is planned for stool studies to rule out recurrent C. difficile or other infectious process and started on a bile acid binder.  She does have a history of recurrent diverticulitis status post hemicolectomy and mesh removal (obstructing sigmoid colon) in May 2021.  Patient has concerns that left her mesh remains in her abdomen.  Do not suspect this is lower abdominal physical exam was fairly unremarkable.  Nevertheless we would further evaluate with CT scan. Labs 8/15/2022:Negative stool culture, positive C. difficile organism however negative C. difficile toxin, negative stool O&P. 14-day course of vancomycin was sent. EGD 8/24/2022:Normal esophagus.  Erythematous mucosa in the gastric body, biopsied.  Normal duodenum.  Pathology revealed mild chronic inactive gastritis without evidence of H. pylori. CT scan of the abdomen/pelvis with contrast 8/31/2022:No acute abnormality in the abdomen or pelvis.  Diverticulosis of the proximal small bowel without evidence of acute diverticulitis.  Diverticulosis of the sigmoid colon without evidence of acute diverticulitis.  Partial resection of the rectosigmoid colon with 3 anastomosis.  Cholecystectomy and hysterectomy noted. Repeat C. difficile toxin on 9/7/2022 was negative.   8/12/2022: 71 year old female with a history of C. difficile colitis s/p treatment with Zinplava, recurrent diverticulitis s/p hemicolectomy and mesh removal (May 2021),  fibromyalgia, IBS, and diabetes presents for long interval follow up and evaluation of abdominal pain. She was last seen on 10/18/2021. She reported recurrent diarrhea and was planned for stool studies. She does have a history of C. diff in the past.   C. diff was testing was positive. SHe was treated with Dificid. She was also treated with one time infusion of Zinplava for prevention of C. diff recurrence. Patient called the office on 8/11/22 complaining of abdominal discomfort for over a month.   She reports a new onset of epigastric pain that radiates to her back, ongoing for a few months. The pain worsens with meals. SHe notices her blood pressure increases when she eats due to the worsening pain. SHe denies associated nausea and vomiting. She does not take NSAIDs often. She tries to take Tylenol. She has tried milk to coat her stomach. She states her stools are "watery and slimy." SHe also admits to fecal incontinence. She denies nocturnal stools. She will have multiple soft bowel movements daily. Her stools will turn dark when she takes Pepto Bismol. Imodium and Pepto Bismol does not help though she does not take daily. She states her  has been in the hospital with double PNA. She has been in and out of the hospital visiting him. She has been stressed and overwhelmed with her life right now. She wonders if she has a stress ulcer. 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   10/18/2021: 70-year-old female presenting for follow-up.  She was last seen in clinic on June 9, 2021. She has had 1 week of diarrhea, and she has tried several OTC medications. She has every few hours. She Has been going to the hospital for physical therapy over the past several weeks.  She has not been treated with any antibiotics.  She denies any rectal bleeding.  6/9/21: 70-year-old female presenting for follow-up.She was last seen in clinic on February 25, 2021.  She has had a history of recurrent diverticulitis and has been treated with antibiotics several times.  She was referred to surgery for evaluation of a possible hemicolectomy.  She had her hemicolectomy on May 4. She was found to have old mesh that had wrapped around her sigmoid colon. This was removed along with her sigmoid colon. She currently feels better.  2/25/21: 71 y/o female presenting as a hospital ER f/u. She was seen in the ER and dx with possible diverticulitis (non-contrast ct). She has had a hx of diverticulitis in the past. She has been going to [place] to a Urologist. She just saw them last week. She was found to have multiple left kidney stones. She began to drink water and had been evidently passing some kidney stones and blood.   I did send her Abx, but she did not start them. She does still have some discomfort in the Left lower quadrant.   12/23/2020: This is a 70-year-old female with a history of fibromyalgia, IBS, diabetes, and recurrent C. difficile presenting for follow-up regarding diverticulitis.   She was last seen 10/28/2020.  She had experienced recurrent C. difficile infections 3 times over the last year.  She was taking daily probiotics.  Since July, she had required another course of antibiotics for C. difficile.  She was doing well.  She denied diarrhea or abdominal pain.  She was instructed to continue daily probiotics and to check a stool for C. difficile if diarrhea recurred.  She reports onset of intense pain indicated a left lower quadrant on 12/9/20.  She went to the emergency department at [facility] where labs and a CT scan were performed.  She was diagnosed with urinary tract infection and diverticulitis.  She was prescribed 2 different antibiotics, one of which was metronidazole.  She completed therapy.    She reports improvement of pain.  She has intermittent, sporadic soreness and discomfort in the left lower quadrant.  She is taking dicyclomine as needed reporting that it is somewhat effective.  She is having 2-3 bowel movements per day which is her baseline stool frequency.  Her stool consistency is "forming."  She denies diarrhea.  She is taking famotidine at bedtime.  2 or 3 nights a week, she is experiencing heartburn and reflux.  She has occasional nausea.  She denies any other abdominal symptoms.  She has tried taking fiber in the past reports it causes abdominal pain.  She is complaining of fatigue and low energy.  She is taking a probiotic daily as well as a multivitamin contains a probiotic. 71 y/o female presenting as a hospital ER f/u. She was seen in the ER and dx with possible diverticulitis (non-contrast ct). She has had a hx of diverticulitis in the past. She has been going to [place] to a Urologist. She just saw them last week. She was found to have multiple left kidney stones. She began to drink water and had been evidently passing some kidney stones and blood.   I did send her Abx but she did not start them. She does still have some discomfort in the Left lower quadrant.

## 2022-09-27 ENCOUNTER — WEB ENCOUNTER (OUTPATIENT)
Dept: URBAN - METROPOLITAN AREA CLINIC 113 | Facility: CLINIC | Age: 72
End: 2022-09-27

## 2023-01-30 ENCOUNTER — ERX REFILL RESPONSE (OUTPATIENT)
Dept: URBAN - METROPOLITAN AREA CLINIC 113 | Facility: CLINIC | Age: 73
End: 2023-01-30

## 2023-01-30 RX ORDER — SUCRALFATE 1 G/1
TAKE ONE TABLET BY MOUTH TWICE DAILY ON AN EMPTY STOMACH TABLET ORAL
Qty: 60 TABLET | Refills: 4 | OUTPATIENT

## 2023-01-30 RX ORDER — SUCRALFATE 1 G/1
TAKE ONE TABLET BY MOUTH TWICE DAILY ON AN EMPTY STOMACH TABLET ORAL
Qty: 60 TABLET | Refills: 3 | OUTPATIENT

## 2023-04-04 ENCOUNTER — OFFICE VISIT (OUTPATIENT)
Dept: URBAN - METROPOLITAN AREA CLINIC 107 | Facility: CLINIC | Age: 73
End: 2023-04-04
Payer: MEDICARE

## 2023-04-04 ENCOUNTER — WEB ENCOUNTER (OUTPATIENT)
Dept: URBAN - METROPOLITAN AREA CLINIC 107 | Facility: CLINIC | Age: 73
End: 2023-04-04

## 2023-04-04 VITALS
TEMPERATURE: 97.5 F | HEART RATE: 95 BPM | BODY MASS INDEX: 28.79 KG/M2 | RESPIRATION RATE: 18 BRPM | DIASTOLIC BLOOD PRESSURE: 106 MMHG | SYSTOLIC BLOOD PRESSURE: 143 MMHG | WEIGHT: 142.8 LBS | HEIGHT: 59 IN

## 2023-04-04 DIAGNOSIS — R15.9 FULL INCONTINENCE OF FECES: ICD-10-CM

## 2023-04-04 DIAGNOSIS — A04.72 C. DIFFICILE COLITIS: ICD-10-CM

## 2023-04-04 DIAGNOSIS — Z87.19 HISTORY OF DIVERTICULITIS: ICD-10-CM

## 2023-04-04 DIAGNOSIS — Z86.19 HISTORY OF CLOSTRIDIOIDES DIFFICILE COLITIS: ICD-10-CM

## 2023-04-04 DIAGNOSIS — K21.9 GASTROESOPHAGEAL REFLUX DISEASE, UNSPECIFIED WHETHER ESOPHAGITIS PRESENT: ICD-10-CM

## 2023-04-04 DIAGNOSIS — Z90.49 HISTORY OF HEMICOLECTOMY: ICD-10-CM

## 2023-04-04 DIAGNOSIS — R10.13 EPIGASTRIC PAIN: ICD-10-CM

## 2023-04-04 DIAGNOSIS — K57.92 DIVERTICULITIS: ICD-10-CM

## 2023-04-04 DIAGNOSIS — K52.9 CHRONIC DIARRHEA: ICD-10-CM

## 2023-04-04 DIAGNOSIS — A09 DIARRHEA OF INFECTIOUS ORIGIN: ICD-10-CM

## 2023-04-04 DIAGNOSIS — R10.32 LEFT LOWER QUADRANT ABDOMINAL PAIN: ICD-10-CM

## 2023-04-04 PROCEDURE — 99214 OFFICE O/P EST MOD 30 MIN: CPT | Performed by: INTERNAL MEDICINE

## 2023-04-04 RX ORDER — MONTELUKAST SODIUM 10 MG/1
1 TABLET TABLET, FILM COATED ORAL ONCE A DAY
Refills: 0 | Status: ON HOLD | COMMUNITY
Start: 2017-02-13

## 2023-04-04 RX ORDER — INFLUENZA A VIRUS A/VICTORIA/2570/2019 IVR-215 (H1N1) ANTIGEN (FORMALDEHYDE INACTIVATED), INFLUENZA A VIRUS A/DARWIN/9/2021 SAN-010 (H3N2) ANTIGEN (FORMALDEHYDE INACTIVATED), INFLUENZA B VIRUS B/PHUKET/3073/2013 ANTIGEN (FORMALDEHYDE INACTIVATED), AND INFLUENZA B VIRUS B/MICHIGAN/01/2021 ANTIGEN (FORMALDEHYDE INACTIVATED) 60; 60; 60; 60 UG/.7ML; UG/.7ML; UG/.7ML; UG/.7ML
AS DIRECTED INJECTION, SUSPENSION INTRAMUSCULAR
Status: ON HOLD | COMMUNITY

## 2023-04-04 RX ORDER — FIDAXOMICIN 200 MG/1
1 TABLET TABLET, FILM COATED ORAL TWICE A DAY
Status: ON HOLD | COMMUNITY

## 2023-04-04 RX ORDER — FEXOFENADINE HCL 180 MG/1
TAKE 1 TABLET EVERY MORNING TABLET, FILM COATED ORAL
Qty: 30 | Refills: 0 | Status: ON HOLD | COMMUNITY
Start: 2019-05-10

## 2023-04-04 RX ORDER — METHENAMINE HIPPURATE 1 G/1
TAKE ONE TABLET BY MOUTH TWICE DAILY TABLET ORAL
Qty: 60 | Refills: 0 | Status: ON HOLD | COMMUNITY
Start: 2019-01-04

## 2023-04-04 RX ORDER — TAMSULOSIN HYDROCHLORIDE 0.4 MG/1
1 CAPSULE CAPSULE ORAL ONCE A DAY
Status: ON HOLD | COMMUNITY

## 2023-04-04 RX ORDER — SULFAMETHOXAZOLE AND TRIMETHOPRIM 800; 160 MG/1; MG/1
1 TABLET TABLET ORAL TWICE A DAY
Status: ON HOLD | COMMUNITY

## 2023-04-04 RX ORDER — BEZLOTOXUMAB 25 MG/ML
AS DIRECTED INJECTION, SOLUTION INTRAVENOUS
OUTPATIENT

## 2023-04-04 RX ORDER — TOPIRAMATE 50 MG/1
1 TABLET TABLET, FILM COATED ORAL ONCE A DAY
Status: ACTIVE | COMMUNITY

## 2023-04-04 RX ORDER — ACETAMINOPHEN 500 MG
AS DIRECTED TABLET ORAL
Status: ON HOLD | COMMUNITY

## 2023-04-04 RX ORDER — AZELASTINE HYDROCHLORIDE AND FLUTICASONE PROPIONATE 137; 50 UG/1; UG/1
1 SPRAY IN EACH NOSTRIL SPRAY, METERED NASAL
Status: ON HOLD | COMMUNITY

## 2023-04-04 RX ORDER — LEVOTHYROXINE SODIUM 100 UG/1
1 TABLET IN THE MORNING ON AN EMPTY STOMACH TABLET ORAL ONCE A DAY
Status: ACTIVE | COMMUNITY

## 2023-04-04 RX ORDER — SERTRALINE HYDROCHLORIDE 112 UG/1
TAKE 1 TABLET DAILY TABLET ORAL
Refills: 0 | Status: ON HOLD | COMMUNITY

## 2023-04-04 RX ORDER — GABAPENTIN 100 MG/1
1 TABLET CAPSULE ORAL TWICE A DAY
Refills: 0 | Status: ON HOLD | COMMUNITY
Start: 2018-08-30

## 2023-04-04 RX ORDER — SERTRALINE 100 MG/1
TAKE 1 TABLET DAILY AS DIRECTED TABLET, FILM COATED ORAL
Refills: 0 | Status: ACTIVE | COMMUNITY

## 2023-04-04 RX ORDER — COLESEVELAM HYDROCHLORIDE 625 MG/1
1 TABLET TABLET, COATED ORAL TWICE A DAY
Qty: 60 TABLET | Refills: 2 | Status: ACTIVE | COMMUNITY
Start: 2022-08-12

## 2023-04-04 RX ORDER — ICOSAPENT ETHYL 500 MG/1
4 CAPSULES WITH MEALS CAPSULE ORAL TWICE A DAY
Status: ON HOLD | COMMUNITY

## 2023-04-04 RX ORDER — TRAMADOL HYDROCHLORIDE 50 MG/1
1 TABLET AS NEEDED TABLET, FILM COATED ORAL
Status: ON HOLD | COMMUNITY

## 2023-04-04 RX ORDER — EPTINEZUMAB-JJMR 100 MG/ML
AS DIRECTED INJECTION INTRAVENOUS
Status: ON HOLD | COMMUNITY

## 2023-04-04 RX ORDER — ACETAMINOPHEN 325 MG
1 TABLET 500 MG AS NEEDED TABLET ORAL
Status: ON HOLD | COMMUNITY

## 2023-04-04 RX ORDER — TOPIRAMATE 100 MG/1
TAKE 1 CAPSULE BEDTIME CAPSULE, EXTENDED RELEASE ORAL
Refills: 0 | Status: ON HOLD | COMMUNITY

## 2023-04-04 RX ORDER — BUDESONIDE AND FORMOTEROL FUMARATE DIHYDRATE 160; 4.5 UG/1; UG/1
INHALE 2 PUFFS TWICE DAILY. RINSE MOUTH AFTER USE AEROSOL RESPIRATORY (INHALATION)
Refills: 0 | Status: ON HOLD | COMMUNITY

## 2023-04-04 RX ORDER — POLYETHYLENE GLYCOL 3350 17 G/17G
AS DIRECTED POWDER, FOR SOLUTION ORAL
Status: ON HOLD | COMMUNITY

## 2023-04-04 RX ORDER — OMEPRAZOLE 40 MG/1
1 CAPSULE WITH EVENING MEAL CAPSULE, DELAYED RELEASE ORAL ONCE A DAY
Qty: 90 | Refills: 4 | Status: ON HOLD | COMMUNITY

## 2023-04-04 RX ORDER — SUMATRIPTAN SUCCINATE 100 MG/1
1 TABLET AT LEAST 2 HOURS BETWEEN DOSES AS NEEDED TABLET, FILM COATED ORAL TWICE A DAY
Status: ON HOLD | COMMUNITY

## 2023-04-04 RX ORDER — ALPRAZOLAM 1 MG/1
TAKE 1 TABLET 4 TIMES DAILY TABLET ORAL
Refills: 0 | Status: ON HOLD | COMMUNITY
Start: 2015-02-02

## 2023-04-04 RX ORDER — EVOLOCUMAB 140 MG/ML
INJECTION, SOLUTION SUBCUTANEOUS
Qty: 2 | Refills: 0 | Status: ACTIVE | COMMUNITY
Start: 2020-05-15

## 2023-04-04 RX ORDER — BEZLOTOXUMAB 25 MG/ML
AS DIRECTED INJECTION, SOLUTION INTRAVENOUS
Status: ON HOLD | COMMUNITY
Start: 2021-10-22

## 2023-04-04 RX ORDER — FAMOTIDINE 40 MG/1
TAKE 1 TABLET AT BEDTIME TABLET ORAL
Refills: 0 | Status: ON HOLD | COMMUNITY

## 2023-04-04 RX ORDER — CIPROFLOXACIN 500 MG/1
1 TABLET TABLET, FILM COATED ORAL
Qty: 28 TABLET | Refills: 1 | Status: ON HOLD | COMMUNITY
Start: 2021-02-22

## 2023-04-04 RX ORDER — NABUMETONE 500 MG/1
1 TABLET TABLET, FILM COATED ORAL TWICE A DAY
Status: ON HOLD | COMMUNITY

## 2023-04-04 RX ORDER — TRAZODONE HYDROCHLORIDE 150 MG/1
1 TABLET AT BEDTIME TABLET ORAL ONCE A DAY
Status: ON HOLD | COMMUNITY

## 2023-04-04 RX ORDER — TRAZODONE HYDROCHLORIDE 150 MG/1
1 TABLET AT BEDTIME TABLET ORAL ONCE A DAY
Status: ACTIVE | COMMUNITY

## 2023-04-04 RX ORDER — MECLIZINE HYDROCHLORIDE 12.5 MG/1
TAKE 1-2 TABLETS DAILY AS DIRECTED TABLET ORAL
Refills: 0 | Status: ON HOLD | COMMUNITY

## 2023-04-04 RX ORDER — FIDAXOMICIN 200 MG/1
1 TABLET TABLET, FILM COATED ORAL TWICE A DAY
OUTPATIENT

## 2023-04-04 RX ORDER — DICYCLOMINE HYDROCHLORIDE 10 MG/1
TAKE 1 CAPSULE BY MOUTH EVERY 4 TO 6 HOURS AS NEEDED FOR ABDOMINAL PAIN CAPSULE ORAL
Qty: 60 | Refills: 2 | Status: ON HOLD | COMMUNITY
Start: 2020-04-13

## 2023-04-04 RX ORDER — METRONIDAZOLE 500 MG/1
1 TABLET TABLET, FILM COATED ORAL THREE TIMES A DAY
Qty: 42 TABLET | Refills: 1 | Status: ON HOLD | COMMUNITY
Start: 2021-02-22

## 2023-04-04 RX ORDER — SUCRALFATE 1 G/1
TAKE ONE TABLET BY MOUTH TWICE DAILY ON AN EMPTY STOMACH TABLET ORAL
Qty: 60 TABLET | Refills: 3 | Status: ACTIVE | COMMUNITY

## 2023-04-04 NOTE — HPI-TODAY'S VISIT:
71 y/o female presenting for f/u with a c/o diarrhea. She was last seen in September 2022 and has a hx of recurrent CDI. She has been having issues with UTI for the last year. She has been put on several rounds of ABX. She has had subsequent diarrhea and LLQ abdominal discomfort. She has diarrhea over 10 times per day. She has not been checked for Cdiff yet.   She has had a urology MD in the past but has not had one recently. She has had repeated UTI treated by her primary MD.   9/23/22 71-year-old female presents for follow-up.  She was last seen on 8/12/2022.  She did report a new onset of severe, sharp epigastric pain that radiates to the back and worsens with food.  No associated nausea or vomiting.  Differential included peptic ulcer versus pancreatic pathology versus functional GI disorder versus other.  She is planned for EGD to evaluate for gastroesophageal pathology.  She was also planned for CT scan of the abdomen/pelvis to rule out pancreatitis or other acute intra-abdominal pathology.  We would obtain blood work as well.  She was started on Carafate in the interim for hopeful relief of pain.  She does have a history of recurrent C. difficile colitis status posttreatment with some plaque infusion.  She complained of chronic, watery diarrhea and fecal incontinence.  She is planned for stool studies to rule out recurrent C. difficile or other infectious process and started on a bile acid binder.  She does have a history of recurrent diverticulitis status post hemicolectomy and mesh removal (obstructing sigmoid colon) in May 2021.  Patient has concerns that left her mesh remains in her abdomen.  Do not suspect this is lower abdominal physical exam was fairly unremarkable.  Nevertheless we would further evaluate with CT scan. Labs 8/15/2022:Negative stool culture, positive C. difficile organism however negative C. difficile toxin, negative stool O&P. 14-day course of vancomycin was sent. EGD 8/24/2022:Normal esophagus.  Erythematous mucosa in the gastric body, biopsied.  Normal duodenum.  Pathology revealed mild chronic inactive gastritis without evidence of H. pylori. CT scan of the abdomen/pelvis with contrast 8/31/2022:No acute abnormality in the abdomen or pelvis.  Diverticulosis of the proximal small bowel without evidence of acute diverticulitis.  Diverticulosis of the sigmoid colon without evidence of acute diverticulitis.  Partial resection of the rectosigmoid colon with 3 anastomosis.  Cholecystectomy and hysterectomy noted. Repeat C. difficile toxin on 9/7/2022 was negative.   8/12/2022: 71 year old female with a history of C. difficile colitis s/p treatment with Zinplava, recurrent diverticulitis s/p hemicolectomy and mesh removal (May 2021),  fibromyalgia, IBS, and diabetes presents for long interval follow up and evaluation of abdominal pain. She was last seen on 10/18/2021. She reported recurrent diarrhea and was planned for stool studies. She does have a history of C. diff in the past.   C. diff was testing was positive. SHe was treated with Dificid. She was also treated with one time infusion of Zinplava for prevention of C. diff recurrence. Patient called the office on 8/11/22 complaining of abdominal discomfort for over a month.   She reports a new onset of epigastric pain that radiates to her back, ongoing for a few months. The pain worsens with meals. SHe notices her blood pressure increases when she eats due to the worsening pain. SHe denies associated nausea and vomiting. She does not take NSAIDs often. She tries to take Tylenol. She has tried milk to coat her stomach. She states her stools are "watery and slimy." SHe also admits to fecal incontinence. She denies nocturnal stools. She will have multiple soft bowel movements daily. Her stools will turn dark when she takes Pepto Bismol. Imodium and Pepto Bismol does not help though she does not take daily. She states her  has been in the hospital with double PNA. She has been in and out of the hospital visiting him. She has been stressed and overwhelmed with her life right now. She wonders if she has a stress ulcer. 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   10/18/2021: 70-year-old female presenting for follow-up.  She was last seen in clinic on June 9, 2021. She has had 1 week of diarrhea, and she has tried several OTC medications. She has every few hours. She Has been going to the hospital for physical therapy over the past several weeks.  She has not been treated with any antibiotics.  She denies any rectal bleeding.  6/9/21: 70-year-old female presenting for follow-up.She was last seen in clinic on February 25, 2021.  She has had a history of recurrent diverticulitis and has been treated with antibiotics several times.  She was referred to surgery for evaluation of a possible hemicolectomy.  She had her hemicolectomy on May 4. She was found to have old mesh that had wrapped around her sigmoid colon. This was removed along with her sigmoid colon. She currently feels better.  2/25/21: 69 y/o female presenting as a hospital ER f/u. She was seen in the ER and dx with possible diverticulitis (non-contrast ct). She has had a hx of diverticulitis in the past. She has been going to [place] to a Urologist. She just saw them last week. She was found to have multiple left kidney stones. She began to drink water and had been evidently passing some kidney stones and blood.   I did send her Abx, but she did not start them. She does still have some discomfort in the Left lower quadrant.   12/23/2020: This is a 70-year-old female with a history of fibromyalgia, IBS, diabetes, and recurrent C. difficile presenting for follow-up regarding diverticulitis.   She was last seen 10/28/2020.  She had experienced recurrent C. difficile infections 3 times over the last year.  She was taking daily probiotics.  Since July, she had required another course of antibiotics for C. difficile.  She was doing well.  She denied diarrhea or abdominal pain.  She was instructed to continue daily probiotics and to check a stool for C. difficile if diarrhea recurred.  She reports onset of intense pain indicated a left lower quadrant on 12/9/20.  She went to the emergency department at [facility] where labs and a CT scan were performed.  She was diagnosed with urinary tract infection and diverticulitis.  She was prescribed 2 different antibiotics, one of which was metronidazole.  She completed therapy.    She reports improvement of pain.  She has intermittent, sporadic soreness and discomfort in the left lower quadrant.  She is taking dicyclomine as needed reporting that it is somewhat effective.  She is having 2-3 bowel movements per day which is her baseline stool frequency.  Her stool consistency is "forming."  She denies diarrhea.  She is taking famotidine at bedtime.  2 or 3 nights a week, she is experiencing heartburn and reflux.  She has occasional nausea.  She denies any other abdominal symptoms.  She has tried taking fiber in the past reports it causes abdominal pain.  She is complaining of fatigue and low energy.  She is taking a probiotic daily as well as a multivitamin contains a probiotic. 69 y/o female presenting as a hospital ER f/u. She was seen in the ER and dx with possible diverticulitis (non-contrast ct). She has had a hx of diverticulitis in the past. She has been going to [place] to a Urologist. She just saw them last week. She was found to have multiple left kidney stones. She began to drink water and had been evidently passing some kidney stones and blood.   I did send her Abx but she did not start them. She does still have some discomfort in the Left lower quadrant.

## 2023-04-11 ENCOUNTER — TELEPHONE ENCOUNTER (OUTPATIENT)
Dept: URBAN - METROPOLITAN AREA CLINIC 113 | Facility: CLINIC | Age: 73
End: 2023-04-11

## 2023-07-06 ENCOUNTER — DASHBOARD ENCOUNTERS (OUTPATIENT)
Age: 73
End: 2023-07-06

## 2023-07-06 ENCOUNTER — OFFICE VISIT (OUTPATIENT)
Dept: URBAN - METROPOLITAN AREA CLINIC 113 | Facility: CLINIC | Age: 73
End: 2023-07-06
Payer: MEDICARE

## 2023-07-06 VITALS — TEMPERATURE: 97.7 F | HEIGHT: 59 IN | WEIGHT: 144.8 LBS | BODY MASS INDEX: 29.19 KG/M2

## 2023-07-06 DIAGNOSIS — A04.8 BACTERIAL INFECTION DUE TO H. PYLORI: ICD-10-CM

## 2023-07-06 DIAGNOSIS — K21.9 ACID REFLUX: ICD-10-CM

## 2023-07-06 DIAGNOSIS — Z86.19 HISTORY OF CLOSTRIDIOIDES DIFFICILE COLITIS: ICD-10-CM

## 2023-07-06 DIAGNOSIS — K57.92 DIVERTICULITIS: ICD-10-CM

## 2023-07-06 PROCEDURE — 99214 OFFICE O/P EST MOD 30 MIN: CPT | Performed by: INTERNAL MEDICINE

## 2023-07-06 RX ORDER — EPTINEZUMAB-JJMR 100 MG/ML
AS DIRECTED INJECTION INTRAVENOUS
Status: ON HOLD | COMMUNITY

## 2023-07-06 RX ORDER — ALPRAZOLAM 1 MG/1
TAKE 1 TABLET 4 TIMES DAILY TABLET ORAL
Refills: 0 | Status: ON HOLD | COMMUNITY
Start: 2015-02-02

## 2023-07-06 RX ORDER — SULFAMETHOXAZOLE AND TRIMETHOPRIM 800; 160 MG/1; MG/1
1 TABLET TABLET ORAL TWICE A DAY
Status: ON HOLD | COMMUNITY

## 2023-07-06 RX ORDER — POLYETHYLENE GLYCOL 3350 17 G/17G
AS DIRECTED POWDER, FOR SOLUTION ORAL
Status: ON HOLD | COMMUNITY

## 2023-07-06 RX ORDER — TOPIRAMATE 50 MG/1
1.5 TABLET TABLET, FILM COATED ORAL TWICE A DAY
Status: ACTIVE | COMMUNITY

## 2023-07-06 RX ORDER — FEXOFENADINE HCL 180 MG/1
TAKE 1 TABLET EVERY MORNING TABLET, FILM COATED ORAL
Qty: 30 | Refills: 0 | Status: ON HOLD | COMMUNITY
Start: 2019-05-10

## 2023-07-06 RX ORDER — OMEPRAZOLE 40 MG/1
1 CAPSULE WITH EVENING MEAL CAPSULE, DELAYED RELEASE ORAL ONCE A DAY
Qty: 90 | Refills: 4 | Status: ON HOLD | COMMUNITY

## 2023-07-06 RX ORDER — METHENAMINE HIPPURATE 1 G/1
TAKE ONE TABLET BY MOUTH TWICE DAILY TABLET ORAL
Qty: 60 | Refills: 0 | Status: ON HOLD | COMMUNITY
Start: 2019-01-04

## 2023-07-06 RX ORDER — MECLIZINE HYDROCHLORIDE 12.5 MG/1
TAKE 1-2 TABLETS DAILY AS DIRECTED TABLET ORAL
Refills: 0 | Status: ON HOLD | COMMUNITY

## 2023-07-06 RX ORDER — MONTELUKAST SODIUM 10 MG/1
1 TABLET TABLET, FILM COATED ORAL ONCE A DAY
Refills: 0 | Status: ON HOLD | COMMUNITY
Start: 2017-02-13

## 2023-07-06 RX ORDER — BUDESONIDE AND FORMOTEROL FUMARATE DIHYDRATE 160; 4.5 UG/1; UG/1
INHALE 2 PUFFS TWICE DAILY. RINSE MOUTH AFTER USE AEROSOL RESPIRATORY (INHALATION)
Refills: 0 | Status: ON HOLD | COMMUNITY

## 2023-07-06 RX ORDER — SERTRALINE 100 MG/1
1 TABLET TABLET, FILM COATED ORAL ONCE A DAY
Refills: 0 | Status: ACTIVE | COMMUNITY

## 2023-07-06 RX ORDER — LEVOTHYROXINE SODIUM 100 UG/1
1 TABLET IN THE MORNING ON AN EMPTY STOMACH TABLET ORAL ONCE A DAY
Status: ACTIVE | COMMUNITY

## 2023-07-06 RX ORDER — EVOLOCUMAB 140 MG/ML
INJECTION, SOLUTION SUBCUTANEOUS
Qty: 2 | Refills: 0 | Status: ACTIVE | COMMUNITY
Start: 2020-05-15

## 2023-07-06 RX ORDER — GABAPENTIN 100 MG/1
1 TABLET CAPSULE ORAL TWICE A DAY
Refills: 0 | Status: ON HOLD | COMMUNITY
Start: 2018-08-30

## 2023-07-06 RX ORDER — TRAZODONE HYDROCHLORIDE 150 MG/1
1 TABLET AT BEDTIME TABLET ORAL ONCE A DAY
Status: ON HOLD | COMMUNITY

## 2023-07-06 RX ORDER — ICOSAPENT ETHYL 500 MG/1
4 CAPSULES WITH MEALS CAPSULE ORAL TWICE A DAY
Status: ON HOLD | COMMUNITY

## 2023-07-06 RX ORDER — INFLUENZA A VIRUS A/VICTORIA/2570/2019 IVR-215 (H1N1) ANTIGEN (FORMALDEHYDE INACTIVATED), INFLUENZA A VIRUS A/DARWIN/9/2021 SAN-010 (H3N2) ANTIGEN (FORMALDEHYDE INACTIVATED), INFLUENZA B VIRUS B/PHUKET/3073/2013 ANTIGEN (FORMALDEHYDE INACTIVATED), AND INFLUENZA B VIRUS B/MICHIGAN/01/2021 ANTIGEN (FORMALDEHYDE INACTIVATED) 60; 60; 60; 60 UG/.7ML; UG/.7ML; UG/.7ML; UG/.7ML
AS DIRECTED INJECTION, SUSPENSION INTRAMUSCULAR
Status: ON HOLD | COMMUNITY

## 2023-07-06 RX ORDER — TRAMADOL HYDROCHLORIDE 50 MG/1
1 TABLET AS NEEDED TABLET, FILM COATED ORAL
Status: ON HOLD | COMMUNITY

## 2023-07-06 RX ORDER — FAMOTIDINE 40 MG/1
TAKE 1 TABLET AT BEDTIME TABLET ORAL
Refills: 0 | Status: ON HOLD | COMMUNITY

## 2023-07-06 RX ORDER — ACETAMINOPHEN 325 MG
1 TABLET 500 MG AS NEEDED TABLET ORAL
Status: ON HOLD | COMMUNITY

## 2023-07-06 RX ORDER — TOPIRAMATE 100 MG/1
TAKE 1 CAPSULE BEDTIME CAPSULE, EXTENDED RELEASE ORAL
Refills: 0 | Status: ON HOLD | COMMUNITY

## 2023-07-06 RX ORDER — GLIMEPIRIDE 1 MG/1
1 TABLET WITH BREAKFAST OR THE FIRST MAIN MEAL OF THE DAY TABLET ORAL ONCE A DAY
Status: ACTIVE | COMMUNITY

## 2023-07-06 RX ORDER — FIDAXOMICIN 200 MG/1
1 TABLET TABLET, FILM COATED ORAL TWICE A DAY
Status: ON HOLD | COMMUNITY

## 2023-07-06 RX ORDER — SERTRALINE HYDROCHLORIDE 112 UG/1
TAKE 1 TABLET DAILY TABLET ORAL
Refills: 0 | Status: ON HOLD | COMMUNITY

## 2023-07-06 RX ORDER — SUCRALFATE 1 G/1
TAKE ONE TABLET BY MOUTH TWICE DAILY ON AN EMPTY STOMACH TABLET ORAL
Qty: 60 TABLET | Refills: 3 | Status: ACTIVE | COMMUNITY

## 2023-07-06 RX ORDER — NABUMETONE 500 MG/1
1 TABLET TABLET, FILM COATED ORAL TWICE A DAY
Status: ON HOLD | COMMUNITY

## 2023-07-06 RX ORDER — ACETAMINOPHEN 500 MG
AS DIRECTED TABLET ORAL
Status: ON HOLD | COMMUNITY

## 2023-07-06 RX ORDER — BEZLOTOXUMAB 25 MG/ML
AS DIRECTED INJECTION, SOLUTION INTRAVENOUS
Status: ON HOLD | COMMUNITY

## 2023-07-06 RX ORDER — METRONIDAZOLE 500 MG/1
1 TABLET TABLET, FILM COATED ORAL THREE TIMES A DAY
Qty: 42 TABLET | Refills: 1 | Status: ON HOLD | COMMUNITY
Start: 2021-02-22

## 2023-07-06 RX ORDER — SUMATRIPTAN SUCCINATE 100 MG/1
1 TABLET AT LEAST 2 HOURS BETWEEN DOSES AS NEEDED TABLET, FILM COATED ORAL
Status: ACTIVE | COMMUNITY

## 2023-07-06 RX ORDER — TRAZODONE HYDROCHLORIDE 150 MG/1
1 TABLET AT BEDTIME TABLET ORAL ONCE A DAY
Status: ACTIVE | COMMUNITY

## 2023-07-06 RX ORDER — TAMSULOSIN HYDROCHLORIDE 0.4 MG/1
1 CAPSULE CAPSULE ORAL ONCE A DAY
Status: ON HOLD | COMMUNITY

## 2023-07-06 RX ORDER — CIPROFLOXACIN 500 MG/1
1 TABLET TABLET, FILM COATED ORAL
Qty: 28 TABLET | Refills: 1 | Status: ON HOLD | COMMUNITY
Start: 2021-02-22

## 2023-07-06 RX ORDER — COLESEVELAM HYDROCHLORIDE 625 MG/1
1 TABLET TABLET, COATED ORAL TWICE A DAY
Qty: 60 TABLET | Refills: 2 | Status: ACTIVE | COMMUNITY
Start: 2022-08-12

## 2023-07-06 RX ORDER — DICYCLOMINE HYDROCHLORIDE 10 MG/1
TAKE 1 CAPSULE BY MOUTH EVERY 4 TO 6 HOURS AS NEEDED FOR ABDOMINAL PAIN CAPSULE ORAL
Qty: 60 | Refills: 2 | Status: ON HOLD | COMMUNITY
Start: 2020-04-13

## 2023-07-06 RX ORDER — AZELASTINE HYDROCHLORIDE AND FLUTICASONE PROPIONATE 137; 50 UG/1; UG/1
1 SPRAY IN EACH NOSTRIL SPRAY, METERED NASAL
Status: ON HOLD | COMMUNITY

## 2023-07-06 NOTE — HPI-TODAY'S VISIT:
71 y/o female presenting for f/u. She has a hx of recurrent CDI and was treated in the past. She denies any diarrhea. She was dx with DM and was placed on Metformin. She did not tolerate this and was changed to Glimepride. She does not have any other issues.   4/4/23 71 y/o female presenting for f/u with a c/o diarrhea. She was last seen in September 2022 and has a hx of recurrent CDI. She has been having issues with UTI for the last year. She has been put on several rounds of ABX. She has had subsequent diarrhea and LLQ abdominal discomfort. She has diarrhea over 10 times per day. She has not been checked for Cdiff yet.   She has had a urology MD in the past but has not had one recently. She has had repeated UTI treated by her primary MD.   9/23/22 71-year-old female presents for follow-up.  She was last seen on 8/12/2022.  She did report a new onset of severe, sharp epigastric pain that radiates to the back and worsens with food.  No associated nausea or vomiting.  Differential included peptic ulcer versus pancreatic pathology versus functional GI disorder versus other.  She is planned for EGD to evaluate for gastroesophageal pathology.  She was also planned for CT scan of the abdomen/pelvis to rule out pancreatitis or other acute intra-abdominal pathology.  We would obtain blood work as well.  She was started on Carafate in the interim for hopeful relief of pain.  She does have a history of recurrent C. difficile colitis status posttreatment with some plaque infusion.  She complained of chronic, watery diarrhea and fecal incontinence.  She is planned for stool studies to rule out recurrent C. difficile or other infectious process and started on a bile acid binder.  She does have a history of recurrent diverticulitis status post hemicolectomy and mesh removal (obstructing sigmoid colon) in May 2021.  Patient has concerns that left her mesh remains in her abdomen.  Do not suspect this is lower abdominal physical exam was fairly unremarkable.  Nevertheless we would further evaluate with CT scan. Labs 8/15/2022:Negative stool culture, positive C. difficile organism however negative C. difficile toxin, negative stool O&P. 14-day course of vancomycin was sent. EGD 8/24/2022:Normal esophagus.  Erythematous mucosa in the gastric body, biopsied.  Normal duodenum.  Pathology revealed mild chronic inactive gastritis without evidence of H. pylori. CT scan of the abdomen/pelvis with contrast 8/31/2022:No acute abnormality in the abdomen or pelvis.  Diverticulosis of the proximal small bowel without evidence of acute diverticulitis.  Diverticulosis of the sigmoid colon without evidence of acute diverticulitis.  Partial resection of the rectosigmoid colon with 3 anastomosis.  Cholecystectomy and hysterectomy noted. Repeat C. difficile toxin on 9/7/2022 was negative.   8/12/2022: 71 year old female with a history of C. difficile colitis s/p treatment with Zinplava, recurrent diverticulitis s/p hemicolectomy and mesh removal (May 2021),  fibromyalgia, IBS, and diabetes presents for long interval follow up and evaluation of abdominal pain. She was last seen on 10/18/2021. She reported recurrent diarrhea and was planned for stool studies. She does have a history of C. diff in the past.   C. diff was testing was positive. SHe was treated with Dificid. She was also treated with one time infusion of Zinplava for prevention of C. diff recurrence. Patient called the office on 8/11/22 complaining of abdominal discomfort for over a month.   She reports a new onset of epigastric pain that radiates to her back, ongoing for a few months. The pain worsens with meals. SHe notices her blood pressure increases when she eats due to the worsening pain. SHe denies associated nausea and vomiting. She does not take NSAIDs often. She tries to take Tylenol. She has tried milk to coat her stomach. She states her stools are "watery and slimy." SHe also admits to fecal incontinence. She denies nocturnal stools. She will have multiple soft bowel movements daily. Her stools will turn dark when she takes Pepto Bismol. Imodium and Pepto Bismol does not help though she does not take daily. She states her  has been in the hospital with double PNA. She has been in and out of the hospital visiting him. She has been stressed and overwhelmed with her life right now. She wonders if she has a stress ulcer. 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   10/18/2021: 70-year-old female presenting for follow-up.  She was last seen in clinic on June 9, 2021. She has had 1 week of diarrhea, and she has tried several OTC medications. She has every few hours. She Has been going to the hospital for physical therapy over the past several weeks.  She has not been treated with any antibiotics.  She denies any rectal bleeding.  6/9/21: 70-year-old female presenting for follow-up.She was last seen in clinic on February 25, 2021.  She has had a history of recurrent diverticulitis and has been treated with antibiotics several times.  She was referred to surgery for evaluation of a possible hemicolectomy.  She had her hemicolectomy on May 4. She was found to have old mesh that had wrapped around her sigmoid colon. This was removed along with her sigmoid colon. She currently feels better.  2/25/21: 71 y/o female presenting as a hospital ER f/u. She was seen in the ER and dx with possible diverticulitis (non-contrast ct). She has had a hx of diverticulitis in the past. She has been going to [place] to a Urologist. She just saw them last week. She was found to have multiple left kidney stones. She began to drink water and had been evidently passing some kidney stones and blood.   I did send her Abx, but she did not start them. She does still have some discomfort in the Left lower quadrant.   12/23/2020: This is a 70-year-old female with a history of fibromyalgia, IBS, diabetes, and recurrent C. difficile presenting for follow-up regarding diverticulitis.   She was last seen 10/28/2020.  She had experienced recurrent C. difficile infections 3 times over the last year.  She was taking daily probiotics.  Since July, she had required another course of antibiotics for C. difficile.  She was doing well.  She denied diarrhea or abdominal pain.  She was instructed to continue daily probiotics and to check a stool for C. difficile if diarrhea recurred.  She reports onset of intense pain indicated a left lower quadrant on 12/9/20.  She went to the emergency department at [facility] where labs and a CT scan were performed.  She was diagnosed with urinary tract infection and diverticulitis.  She was prescribed 2 different antibiotics, one of which was metronidazole.  She completed therapy.    She reports improvement of pain.  She has intermittent, sporadic soreness and discomfort in the left lower quadrant.  She is taking dicyclomine as needed reporting that it is somewhat effective.  She is having 2-3 bowel movements per day which is her baseline stool frequency.  Her stool consistency is "forming."  She denies diarrhea.  She is taking famotidine at bedtime.  2 or 3 nights a week, she is experiencing heartburn and reflux.  She has occasional nausea.  She denies any other abdominal symptoms.  She has tried taking fiber in the past reports it causes abdominal pain.  She is complaining of fatigue and low energy.  She is taking a probiotic daily as well as a multivitamin contains a probiotic. 71 y/o female presenting as a hospital ER f/u. She was seen in the ER and dx with possible diverticulitis (non-contrast ct). She has had a hx of diverticulitis in the past. She has been going to [place] to a Urologist. She just saw them last week. She was found to have multiple left kidney stones. She began to drink water and had been evidently passing some kidney stones and blood.   I did send her Abx but she did not start them. She does still have some discomfort in the Left lower quadrant.

## 2024-06-05 ENCOUNTER — TELEPHONE ENCOUNTER (OUTPATIENT)
Dept: URBAN - METROPOLITAN AREA CLINIC 113 | Facility: CLINIC | Age: 74
End: 2024-06-05

## 2024-06-12 LAB — CLOSTRIDIUM DIFFICILE TOXINB,QL REAL TIME PCR: NOT DETECTED

## 2024-06-25 ENCOUNTER — OFFICE VISIT (OUTPATIENT)
Dept: URBAN - METROPOLITAN AREA CLINIC 113 | Facility: CLINIC | Age: 74
End: 2024-06-25
Payer: COMMERCIAL

## 2024-06-25 VITALS
BODY MASS INDEX: 29.64 KG/M2 | DIASTOLIC BLOOD PRESSURE: 90 MMHG | WEIGHT: 147 LBS | HEART RATE: 103 BPM | TEMPERATURE: 98.1 F | SYSTOLIC BLOOD PRESSURE: 145 MMHG | HEIGHT: 59 IN | RESPIRATION RATE: 18 BRPM

## 2024-06-25 DIAGNOSIS — K21.9 GASTROESOPHAGEAL REFLUX DISEASE, UNSPECIFIED WHETHER ESOPHAGITIS PRESENT: ICD-10-CM

## 2024-06-25 DIAGNOSIS — R10.13 EPIGASTRIC PAIN: ICD-10-CM

## 2024-06-25 DIAGNOSIS — A04.72 C. DIFFICILE COLITIS: ICD-10-CM

## 2024-06-25 DIAGNOSIS — Z90.49 HISTORY OF HEMICOLECTOMY: ICD-10-CM

## 2024-06-25 DIAGNOSIS — R15.9 FULL INCONTINENCE OF FECES: ICD-10-CM

## 2024-06-25 DIAGNOSIS — K57.92 DIVERTICULITIS: ICD-10-CM

## 2024-06-25 DIAGNOSIS — Z86.19 HISTORY OF CLOSTRIDIOIDES DIFFICILE COLITIS: ICD-10-CM

## 2024-06-25 DIAGNOSIS — R10.32 LEFT LOWER QUADRANT ABDOMINAL PAIN: ICD-10-CM

## 2024-06-25 DIAGNOSIS — A09 DIARRHEA OF INFECTIOUS ORIGIN: ICD-10-CM

## 2024-06-25 DIAGNOSIS — Z87.19 HISTORY OF DIVERTICULITIS: ICD-10-CM

## 2024-06-25 DIAGNOSIS — K52.9 CHRONIC DIARRHEA: ICD-10-CM

## 2024-06-25 PROCEDURE — 99214 OFFICE O/P EST MOD 30 MIN: CPT | Performed by: INTERNAL MEDICINE

## 2024-06-25 RX ORDER — TRAZODONE HYDROCHLORIDE 150 MG/1
1 TABLET AT BEDTIME TABLET ORAL ONCE A DAY
Status: ON HOLD | COMMUNITY

## 2024-06-25 RX ORDER — ALPRAZOLAM 1 MG/1
TAKE 1 TABLET 4 TIMES DAILY TABLET ORAL
Refills: 0 | Status: ON HOLD | COMMUNITY
Start: 2015-02-02

## 2024-06-25 RX ORDER — ACETAMINOPHEN 500 MG
AS DIRECTED TABLET ORAL
Status: ON HOLD | COMMUNITY

## 2024-06-25 RX ORDER — TRAZODONE HYDROCHLORIDE 150 MG/1
1 TABLET AT BEDTIME TABLET ORAL ONCE A DAY
Status: ACTIVE | COMMUNITY

## 2024-06-25 RX ORDER — ACETAMINOPHEN 325 MG
1 TABLET 500 MG AS NEEDED TABLET ORAL
Status: ON HOLD | COMMUNITY

## 2024-06-25 RX ORDER — FAMOTIDINE 40 MG/1
TAKE 1 TABLET AT BEDTIME TABLET ORAL
Refills: 0 | Status: ON HOLD | COMMUNITY

## 2024-06-25 RX ORDER — MECLIZINE HYDROCHLORIDE 12.5 MG/1
TAKE 1-2 TABLETS DAILY AS DIRECTED TABLET ORAL
Refills: 0 | Status: ON HOLD | COMMUNITY

## 2024-06-25 RX ORDER — POLYETHYLENE GLYCOL 3350 17 G/17G
AS DIRECTED POWDER, FOR SOLUTION ORAL
Status: ON HOLD | COMMUNITY

## 2024-06-25 RX ORDER — OMEPRAZOLE 40 MG/1
1 CAPSULE WITH EVENING MEAL CAPSULE, DELAYED RELEASE ORAL ONCE A DAY
Qty: 90 | Refills: 4 | Status: ON HOLD | COMMUNITY

## 2024-06-25 RX ORDER — TOPIRAMATE 50 MG/1
1.5 TABLET TABLET, FILM COATED ORAL TWICE A DAY
Status: ACTIVE | COMMUNITY

## 2024-06-25 RX ORDER — GLIMEPIRIDE 1 MG/1
1 TABLET WITH BREAKFAST OR THE FIRST MAIN MEAL OF THE DAY TABLET ORAL ONCE A DAY
Status: ACTIVE | COMMUNITY

## 2024-06-25 RX ORDER — DIPHENOXYLATE HYDROCHLORIDE AND ATROPINE SULFATE 2.5; .025 MG/1; MG/1
1 TABLET AS NEEDED TABLET ORAL
Qty: 120 TABLET | Refills: 0 | OUTPATIENT
Start: 2024-06-25 | End: 2024-07-25

## 2024-06-25 RX ORDER — TOPIRAMATE 100 MG/1
TAKE 1 CAPSULE BEDTIME CAPSULE, EXTENDED RELEASE ORAL
Refills: 0 | Status: ON HOLD | COMMUNITY

## 2024-06-25 RX ORDER — SERTRALINE HYDROCHLORIDE 112 UG/1
TAKE 1 TABLET DAILY TABLET ORAL
Refills: 0 | Status: ON HOLD | COMMUNITY

## 2024-06-25 RX ORDER — AZELASTINE HYDROCHLORIDE AND FLUTICASONE PROPIONATE 137; 50 UG/1; UG/1
1 SPRAY IN EACH NOSTRIL SPRAY, METERED NASAL
Status: ON HOLD | COMMUNITY

## 2024-06-25 RX ORDER — METHENAMINE HIPPURATE 1 G/1
TAKE ONE TABLET BY MOUTH TWICE DAILY TABLET ORAL
Qty: 60 | Refills: 0 | Status: ON HOLD | COMMUNITY
Start: 2019-01-04

## 2024-06-25 RX ORDER — COLESEVELAM HYDROCHLORIDE 625 MG/1
1 TABLET TABLET, COATED ORAL TWICE A DAY
Qty: 60 TABLET | Refills: 2 | Status: ACTIVE | COMMUNITY
Start: 2022-08-12

## 2024-06-25 RX ORDER — MONTELUKAST SODIUM 10 MG/1
1 TABLET TABLET, FILM COATED ORAL ONCE A DAY
Refills: 0 | Status: ON HOLD | COMMUNITY
Start: 2017-02-13

## 2024-06-25 RX ORDER — DICYCLOMINE HYDROCHLORIDE 10 MG/1
TAKE 1 CAPSULE BY MOUTH EVERY 4 TO 6 HOURS AS NEEDED FOR ABDOMINAL PAIN CAPSULE ORAL
Qty: 60 | Refills: 2 | Status: ON HOLD | COMMUNITY
Start: 2020-04-13

## 2024-06-25 RX ORDER — EPTINEZUMAB-JJMR 100 MG/ML
AS DIRECTED INJECTION INTRAVENOUS
Status: ON HOLD | COMMUNITY

## 2024-06-25 RX ORDER — LEVOTHYROXINE SODIUM 100 UG/1
1 TABLET IN THE MORNING ON AN EMPTY STOMACH TABLET ORAL ONCE A DAY
Status: ACTIVE | COMMUNITY

## 2024-06-25 RX ORDER — SULFAMETHOXAZOLE AND TRIMETHOPRIM 800; 160 MG/1; MG/1
1 TABLET TABLET ORAL TWICE A DAY
Status: ON HOLD | COMMUNITY

## 2024-06-25 RX ORDER — INFLUENZA A VIRUS A/VICTORIA/4897/2022 IVR-238 (H1N1) ANTIGEN (FORMALDEHYDE INACTIVATED), INFLUENZA A VIRUS A/DARWIN/9/2021 SAN-010 (H3N2) ANTIGEN (FORMALDEHYDE INACTIVATED), INFLUENZA B VIRUS B/PHUKET/3073/2013 ANTIGEN (FORMALDEHYDE INACTIVATED), AND INFLUENZA B VIRUS B/MICHIGAN/01/2021 ANTIGEN (FORMALDEHYDE INACTIVATED) 60; 60; 60; 60 UG/.7ML; UG/.7ML; UG/.7ML; UG/.7ML
AS DIRECTED INJECTION, SUSPENSION INTRAMUSCULAR
Status: ON HOLD | COMMUNITY

## 2024-06-25 RX ORDER — BUDESONIDE AND FORMOTEROL FUMARATE DIHYDRATE 160; 4.5 UG/1; UG/1
INHALE 2 PUFFS TWICE DAILY. RINSE MOUTH AFTER USE AEROSOL RESPIRATORY (INHALATION)
Refills: 0 | Status: ON HOLD | COMMUNITY

## 2024-06-25 RX ORDER — SUCRALFATE 1 G/1
TAKE ONE TABLET BY MOUTH TWICE DAILY ON AN EMPTY STOMACH TABLET ORAL
Qty: 60 TABLET | Refills: 3 | Status: ACTIVE | COMMUNITY

## 2024-06-25 RX ORDER — TAMSULOSIN HYDROCHLORIDE 0.4 MG/1
1 CAPSULE CAPSULE ORAL ONCE A DAY
Status: ON HOLD | COMMUNITY

## 2024-06-25 RX ORDER — GABAPENTIN 100 MG/1
1 TABLET CAPSULE ORAL TWICE A DAY
Refills: 0 | Status: ON HOLD | COMMUNITY
Start: 2018-08-30

## 2024-06-25 RX ORDER — EVOLOCUMAB 140 MG/ML
INJECTION, SOLUTION SUBCUTANEOUS
Qty: 2 | Refills: 0 | Status: ACTIVE | COMMUNITY
Start: 2020-05-15

## 2024-06-25 RX ORDER — TRAMADOL HYDROCHLORIDE 50 MG/1
1 TABLET AS NEEDED TABLET, FILM COATED ORAL
Status: ON HOLD | COMMUNITY

## 2024-06-25 RX ORDER — FEXOFENADINE HCL 180 MG/1
TAKE 1 TABLET EVERY MORNING TABLET, FILM COATED ORAL
Qty: 30 | Refills: 0 | Status: ON HOLD | COMMUNITY
Start: 2019-05-10

## 2024-06-25 RX ORDER — SERTRALINE 100 MG/1
1 TABLET TABLET, FILM COATED ORAL ONCE A DAY
Refills: 0 | Status: ACTIVE | COMMUNITY

## 2024-06-25 RX ORDER — SUMATRIPTAN SUCCINATE 100 MG/1
1 TABLET AT LEAST 2 HOURS BETWEEN DOSES AS NEEDED TABLET, FILM COATED ORAL
Status: ACTIVE | COMMUNITY

## 2024-06-25 RX ORDER — NABUMETONE 500 MG/1
1 TABLET TABLET, FILM COATED ORAL TWICE A DAY
Status: ON HOLD | COMMUNITY

## 2024-06-25 RX ORDER — FIDAXOMICIN 200 MG/1
1 TABLET TABLET, FILM COATED ORAL TWICE A DAY
Status: ON HOLD | COMMUNITY

## 2024-06-25 RX ORDER — METRONIDAZOLE 500 MG/1
1 TABLET TABLET, FILM COATED ORAL THREE TIMES A DAY
Qty: 42 TABLET | Refills: 1 | Status: ON HOLD | COMMUNITY
Start: 2021-02-22

## 2024-06-25 RX ORDER — BEZLOTOXUMAB 25 MG/ML
AS DIRECTED INJECTION, SOLUTION INTRAVENOUS
Status: ON HOLD | COMMUNITY

## 2024-06-25 RX ORDER — ICOSAPENT ETHYL 500 MG/1
4 CAPSULES WITH MEALS CAPSULE ORAL TWICE A DAY
Status: ON HOLD | COMMUNITY

## 2024-06-25 RX ORDER — ACYCLOVIR 50 MG/G
1 APPLICATION EVERY 3 HOURS OINTMENT TOPICAL
Status: ACTIVE | COMMUNITY

## 2024-06-25 RX ORDER — CIPROFLOXACIN 500 MG/1
1 TABLET TABLET, FILM COATED ORAL
Qty: 28 TABLET | Refills: 1 | Status: ON HOLD | COMMUNITY
Start: 2021-02-22

## 2024-06-25 NOTE — HPI-TODAY'S VISIT:
74 y/o female presenting for f/u. She was last seen in July 2023. She has a hx of chronic recurrent diarrhea. She was diagnosed about 1 month ago. She has pelvix shingles and is being seen by Dr. Houser. She began to have diarrhea after antiviral for shingles treatment. She tried pepto bismol. She could not control any diarrhea. She is off antiviral and continues to have diarrhea. She has about 4-6 bm daily. She also has bowel movements at night. She has been miserable.   7/2023 73 y/o female presenting for f/u. She has a hx of recurrent CDI and was treated in the past. She denies any diarrhea. She was dx with DM and was placed on Metformin. She did not tolerate this and was changed to Glimepride. She does not have any other issues.   4/4/23 73 y/o female presenting for f/u with a c/o diarrhea. She was last seen in September 2022 and has a hx of recurrent CDI. She has been having issues with UTI for the last year. She has been put on several rounds of ABX. She has had subsequent diarrhea and LLQ abdominal discomfort. She has diarrhea over 10 times per day. She has not been checked for Cdiff yet.   She has had a urology MD in the past but has not had one recently. She has had repeated UTI treated by her primary MD.   9/23/22 71-year-old female presents for follow-up.  She was last seen on 8/12/2022.  She did report a new onset of severe, sharp epigastric pain that radiates to the back and worsens with food.  No associated nausea or vomiting.  Differential included peptic ulcer versus pancreatic pathology versus functional GI disorder versus other.  She is planned for EGD to evaluate for gastroesophageal pathology.  She was also planned for CT scan of the abdomen/pelvis to rule out pancreatitis or other acute intra-abdominal pathology.  We would obtain blood work as well.  She was started on Carafate in the interim for hopeful relief of pain.  She does have a history of recurrent C. difficile colitis status posttreatment with some plaque infusion.  She complained of chronic, watery diarrhea and fecal incontinence.  She is planned for stool studies to rule out recurrent C. difficile or other infectious process and started on a bile acid binder.  She does have a history of recurrent diverticulitis status post hemicolectomy and mesh removal (obstructing sigmoid colon) in May 2021.  Patient has concerns that left her mesh remains in her abdomen.  Do not suspect this is lower abdominal physical exam was fairly unremarkable.  Nevertheless we would further evaluate with CT scan. Labs 8/15/2022:Negative stool culture, positive C. difficile organism however negative C. difficile toxin, negative stool O&P. 14-day course of vancomycin was sent. EGD 8/24/2022:Normal esophagus.  Erythematous mucosa in the gastric body, biopsied.  Normal duodenum.  Pathology revealed mild chronic inactive gastritis without evidence of H. pylori. CT scan of the abdomen/pelvis with contrast 8/31/2022:No acute abnormality in the abdomen or pelvis.  Diverticulosis of the proximal small bowel without evidence of acute diverticulitis.  Diverticulosis of the sigmoid colon without evidence of acute diverticulitis.  Partial resection of the rectosigmoid colon with 3 anastomosis.  Cholecystectomy and hysterectomy noted. Repeat C. difficile toxin on 9/7/2022 was negative.   8/12/2022: 71 year old female with a history of C. difficile colitis s/p treatment with Zinplava, recurrent diverticulitis s/p hemicolectomy and mesh removal (May 2021),  fibromyalgia, IBS, and diabetes presents for long interval follow up and evaluation of abdominal pain. She was last seen on 10/18/2021. She reported recurrent diarrhea and was planned for stool studies. She does have a history of C. diff in the past.   C. diff was testing was positive. SHe was treated with Dificid. She was also treated with one time infusion of Zinplava for prevention of C. diff recurrence. Patient called the office on 8/11/22 complaining of abdominal discomfort for over a month.   She reports a new onset of epigastric pain that radiates to her back, ongoing for a few months. The pain worsens with meals. SHe notices her blood pressure increases when she eats due to the worsening pain. SHe denies associated nausea and vomiting. She does not take NSAIDs often. She tries to take Tylenol. She has tried milk to coat her stomach. She states her stools are "watery and slimy." SHe also admits to fecal incontinence. She denies nocturnal stools. She will have multiple soft bowel movements daily. Her stools will turn dark when she takes Pepto Bismol. Imodium and Pepto Bismol does not help though she does not take daily. She states her  has been in the hospital with double PNA. She has been in and out of the hospital visiting him. She has been stressed and overwhelmed with her life right now. She wonders if she has a stress ulcer. 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   10/18/2021: 70-year-old female presenting for follow-up.  She was last seen in clinic on June 9, 2021. She has had 1 week of diarrhea, and she has tried several OTC medications. She has every few hours. She Has been going to the hospital for physical therapy over the past several weeks.  She has not been treated with any antibiotics.  She denies any rectal bleeding.  6/9/21: 70-year-old female presenting for follow-up.She was last seen in clinic on February 25, 2021.  She has had a history of recurrent diverticulitis and has been treated with antibiotics several times.  She was referred to surgery for evaluation of a possible hemicolectomy.  She had her hemicolectomy on May 4. She was found to have old mesh that had wrapped around her sigmoid colon. This was removed along with her sigmoid colon. She currently feels better.  2/25/21: 69 y/o female presenting as a hospital ER f/u. She was seen in the ER and dx with possible diverticulitis (non-contrast ct). She has had a hx of diverticulitis in the past. She has been going to [place] to a Urologist. She just saw them last week. She was found to have multiple left kidney stones. She began to drink water and had been evidently passing some kidney stones and blood.   I did send her Abx, but she did not start them. She does still have some discomfort in the Left lower quadrant.   12/23/2020: This is a 70-year-old female with a history of fibromyalgia, IBS, diabetes, and recurrent C. difficile presenting for follow-up regarding diverticulitis.   She was last seen 10/28/2020.  She had experienced recurrent C. difficile infections 3 times over the last year.  She was taking daily probiotics.  Since July, she had required another course of antibiotics for C. difficile.  She was doing well.  She denied diarrhea or abdominal pain.  She was instructed to continue daily probiotics and to check a stool for C. difficile if diarrhea recurred.  She reports onset of intense pain indicated a left lower quadrant on 12/9/20.  She went to the emergency department at [facility] where labs and a CT scan were performed.  She was diagnosed with urinary tract infection and diverticulitis.  She was prescribed 2 different antibiotics, one of which was metronidazole.  She completed therapy.    She reports improvement of pain.  She has intermittent, sporadic soreness and discomfort in the left lower quadrant.  She is taking dicyclomine as needed reporting that it is somewhat effective.  She is having 2-3 bowel movements per day which is her baseline stool frequency.  Her stool consistency is "forming."  She denies diarrhea.  She is taking famotidine at bedtime.  2 or 3 nights a week, she is experiencing heartburn and reflux.  She has occasional nausea.  She denies any other abdominal symptoms.  She has tried taking fiber in the past reports it causes abdominal pain.  She is complaining of fatigue and low energy.  She is taking a probiotic daily as well as a multivitamin contains a probiotic. 69 y/o female presenting as a hospital ER f/u. She was seen in the ER and dx with possible diverticulitis (non-contrast ct). She has had a hx of diverticulitis in the past. She has been going to [place] to a Urologist. She just saw them last week. She was found to have multiple left kidney stones. She began to drink water and had been evidently passing some kidney stones and blood.   I did send her Abx but she did not start them. She does still have some discomfort in the Left lower quadrant.

## 2024-09-26 ENCOUNTER — OFFICE VISIT (OUTPATIENT)
Dept: URBAN - METROPOLITAN AREA CLINIC 113 | Facility: CLINIC | Age: 74
End: 2024-09-26

## 2024-09-26 RX ORDER — EPTINEZUMAB-JJMR 100 MG/ML
AS DIRECTED INJECTION INTRAVENOUS
Status: ON HOLD | COMMUNITY

## 2024-09-26 RX ORDER — TRAMADOL HYDROCHLORIDE 50 MG/1
1 TABLET AS NEEDED TABLET, FILM COATED ORAL
Status: ON HOLD | COMMUNITY

## 2024-09-26 RX ORDER — SERTRALINE HYDROCHLORIDE 112 UG/1
TAKE 1 TABLET DAILY TABLET ORAL
Refills: 0 | Status: ON HOLD | COMMUNITY

## 2024-09-26 RX ORDER — MECLIZINE HYDROCHLORIDE 12.5 MG/1
TAKE 1-2 TABLETS DAILY AS DIRECTED TABLET ORAL
Refills: 0 | Status: ON HOLD | COMMUNITY

## 2024-09-26 RX ORDER — SUMATRIPTAN SUCCINATE 100 MG/1
1 TABLET AT LEAST 2 HOURS BETWEEN DOSES AS NEEDED TABLET, FILM COATED ORAL
Status: ACTIVE | COMMUNITY

## 2024-09-26 RX ORDER — INFLUENZA A VIRUS A/VICTORIA/4897/2022 IVR-238 (H1N1) ANTIGEN (FORMALDEHYDE INACTIVATED), INFLUENZA A VIRUS A/DARWIN/9/2021 SAN-010 (H3N2) ANTIGEN (FORMALDEHYDE INACTIVATED), INFLUENZA B VIRUS B/PHUKET/3073/2013 ANTIGEN (FORMALDEHYDE INACTIVATED), AND INFLUENZA B VIRUS B/MICHIGAN/01/2021 ANTIGEN (FORMALDEHYDE INACTIVATED) 60; 60; 60; 60 UG/.7ML; UG/.7ML; UG/.7ML; UG/.7ML
AS DIRECTED INJECTION, SUSPENSION INTRAMUSCULAR
Status: ON HOLD | COMMUNITY

## 2024-09-26 RX ORDER — METHENAMINE HIPPURATE 1 G/1
TAKE ONE TABLET BY MOUTH TWICE DAILY TABLET ORAL
Qty: 60 | Refills: 0 | Status: ON HOLD | COMMUNITY
Start: 2019-01-04

## 2024-09-26 RX ORDER — SERTRALINE 100 MG/1
1 TABLET TABLET, FILM COATED ORAL ONCE A DAY
Refills: 0 | Status: ACTIVE | COMMUNITY

## 2024-09-26 RX ORDER — TOPIRAMATE 100 MG/1
TAKE 1 CAPSULE BEDTIME CAPSULE, EXTENDED RELEASE ORAL
Refills: 0 | Status: ON HOLD | COMMUNITY

## 2024-09-26 RX ORDER — LEVOTHYROXINE SODIUM 100 UG/1
1 TABLET IN THE MORNING ON AN EMPTY STOMACH TABLET ORAL ONCE A DAY
Status: ACTIVE | COMMUNITY

## 2024-09-26 RX ORDER — TRAZODONE HYDROCHLORIDE 150 MG/1
1 TABLET AT BEDTIME TABLET ORAL ONCE A DAY
Status: ACTIVE | COMMUNITY

## 2024-09-26 RX ORDER — FIDAXOMICIN 200 MG/1
1 TABLET TABLET, FILM COATED ORAL TWICE A DAY
Status: ON HOLD | COMMUNITY

## 2024-09-26 RX ORDER — SUCRALFATE 1 G/1
TAKE ONE TABLET BY MOUTH TWICE DAILY ON AN EMPTY STOMACH TABLET ORAL
Qty: 60 TABLET | Refills: 3 | Status: ACTIVE | COMMUNITY

## 2024-09-26 RX ORDER — BEZLOTOXUMAB 25 MG/ML
AS DIRECTED INJECTION, SOLUTION INTRAVENOUS
Status: ON HOLD | COMMUNITY

## 2024-09-26 RX ORDER — AZELASTINE HYDROCHLORIDE AND FLUTICASONE PROPIONATE 137; 50 UG/1; UG/1
1 SPRAY IN EACH NOSTRIL SPRAY, METERED NASAL
Status: ON HOLD | COMMUNITY

## 2024-09-26 RX ORDER — FEXOFENADINE HCL 180 MG/1
TAKE 1 TABLET EVERY MORNING TABLET ORAL
Qty: 30 | Refills: 0 | Status: ON HOLD | COMMUNITY
Start: 2019-05-10

## 2024-09-26 RX ORDER — ACYCLOVIR 50 MG/G
1 APPLICATION EVERY 3 HOURS OINTMENT TOPICAL
Status: ACTIVE | COMMUNITY

## 2024-09-26 RX ORDER — MONTELUKAST SODIUM 10 MG/1
1 TABLET TABLET, FILM COATED ORAL ONCE A DAY
Refills: 0 | Status: ON HOLD | COMMUNITY
Start: 2017-02-13

## 2024-09-26 RX ORDER — COLESEVELAM HYDROCHLORIDE 625 MG/1
1 TABLET TABLET, COATED ORAL TWICE A DAY
Qty: 60 TABLET | Refills: 2 | Status: ACTIVE | COMMUNITY
Start: 2022-08-12

## 2024-09-26 RX ORDER — ACETAMINOPHEN 325 MG/1
1 TABLET 500 MG AS NEEDED TABLET, FILM COATED ORAL
Status: ON HOLD | COMMUNITY

## 2024-09-26 RX ORDER — TOPIRAMATE 50 MG/1
1.5 TABLET TABLET, FILM COATED ORAL TWICE A DAY
Status: ACTIVE | COMMUNITY

## 2024-09-26 RX ORDER — BUDESONIDE AND FORMOTEROL FUMARATE DIHYDRATE 160; 4.5 UG/1; UG/1
INHALE 2 PUFFS TWICE DAILY. RINSE MOUTH AFTER USE AEROSOL RESPIRATORY (INHALATION)
Refills: 0 | Status: ON HOLD | COMMUNITY

## 2024-09-26 RX ORDER — DICYCLOMINE HYDROCHLORIDE 10 MG/1
TAKE 1 CAPSULE BY MOUTH EVERY 4 TO 6 HOURS AS NEEDED FOR ABDOMINAL PAIN CAPSULE ORAL
Qty: 60 | Refills: 2 | Status: ON HOLD | COMMUNITY
Start: 2020-04-13

## 2024-09-26 RX ORDER — EVOLOCUMAB 140 MG/ML
INJECTION, SOLUTION SUBCUTANEOUS
Qty: 2 | Refills: 0 | Status: ACTIVE | COMMUNITY
Start: 2020-05-15

## 2024-09-26 RX ORDER — ALPRAZOLAM 1 MG/1
TAKE 1 TABLET 4 TIMES DAILY TABLET ORAL
Refills: 0 | Status: ON HOLD | COMMUNITY
Start: 2015-02-02

## 2024-09-26 RX ORDER — TAMSULOSIN HYDROCHLORIDE 0.4 MG/1
1 CAPSULE CAPSULE ORAL ONCE A DAY
Status: ON HOLD | COMMUNITY

## 2024-09-26 RX ORDER — TRAZODONE HYDROCHLORIDE 150 MG/1
1 TABLET AT BEDTIME TABLET ORAL ONCE A DAY
Status: ON HOLD | COMMUNITY

## 2024-09-26 RX ORDER — OMEPRAZOLE 40 MG/1
1 CAPSULE WITH EVENING MEAL CAPSULE, DELAYED RELEASE ORAL ONCE A DAY
Qty: 90 | Refills: 4 | Status: ON HOLD | COMMUNITY

## 2024-09-26 RX ORDER — ACETAMINOPHEN 500 MG
AS DIRECTED TABLET ORAL
Status: ON HOLD | COMMUNITY

## 2024-09-26 RX ORDER — GLIMEPIRIDE 1 MG/1
1 TABLET WITH BREAKFAST OR THE FIRST MAIN MEAL OF THE DAY TABLET ORAL ONCE A DAY
Status: ACTIVE | COMMUNITY

## 2024-09-26 RX ORDER — ICOSAPENT ETHYL 500 MG/1
4 CAPSULES WITH MEALS CAPSULE ORAL TWICE A DAY
Status: ON HOLD | COMMUNITY

## 2024-09-26 RX ORDER — NABUMETONE 500 MG/1
1 TABLET TABLET, FILM COATED ORAL TWICE A DAY
Status: ON HOLD | COMMUNITY

## 2024-09-26 RX ORDER — SULFAMETHOXAZOLE AND TRIMETHOPRIM 800; 160 MG/1; MG/1
1 TABLET TABLET ORAL TWICE A DAY
Status: ON HOLD | COMMUNITY

## 2024-09-26 RX ORDER — POLYETHYLENE GLYCOL 3350 17 G/17G
AS DIRECTED POWDER, FOR SOLUTION ORAL
Status: ON HOLD | COMMUNITY

## 2024-09-26 RX ORDER — METRONIDAZOLE 500 MG/1
1 TABLET TABLET, FILM COATED ORAL THREE TIMES A DAY
Qty: 42 TABLET | Refills: 1 | Status: ON HOLD | COMMUNITY
Start: 2021-02-22

## 2024-09-26 RX ORDER — GABAPENTIN 100 MG/1
1 TABLET CAPSULE ORAL TWICE A DAY
Refills: 0 | Status: ON HOLD | COMMUNITY
Start: 2018-08-30

## 2024-09-26 RX ORDER — CIPROFLOXACIN 500 MG/1
1 TABLET TABLET, FILM COATED ORAL
Qty: 28 TABLET | Refills: 1 | Status: ON HOLD | COMMUNITY
Start: 2021-02-22

## 2024-09-26 RX ORDER — FAMOTIDINE 40 MG/1
TAKE 1 TABLET AT BEDTIME TABLET ORAL
Refills: 0 | Status: ON HOLD | COMMUNITY

## 2024-09-26 NOTE — HPI-ZZZTODAY'S VISIT
73-year-old female presenting for follow-up.  She was last seen in June 2024.  She does have a history of chronic recurrent C. difficile colitis.  She is also had diverticulitis for which she has had a sigmoid resection.  After her last visit she was having diarrhea after shingles treatment.  Obviously she was concerned about recurrent C. difficile infection.  This was checked and was negative.  She has not been seen since that visit.  She is here today for follow-up.

## 2024-10-23 ENCOUNTER — OFFICE VISIT (OUTPATIENT)
Dept: URBAN - METROPOLITAN AREA CLINIC 113 | Facility: CLINIC | Age: 74
End: 2024-10-23
Payer: MEDICARE

## 2024-10-23 VITALS
RESPIRATION RATE: 20 BRPM | TEMPERATURE: 98.1 F | DIASTOLIC BLOOD PRESSURE: 95 MMHG | HEART RATE: 102 BPM | WEIGHT: 146.8 LBS | SYSTOLIC BLOOD PRESSURE: 142 MMHG | BODY MASS INDEX: 29.6 KG/M2 | HEIGHT: 59 IN

## 2024-10-23 DIAGNOSIS — Z90.49 HISTORY OF HEMICOLECTOMY: ICD-10-CM

## 2024-10-23 DIAGNOSIS — R15.9 FULL INCONTINENCE OF FECES: ICD-10-CM

## 2024-10-23 DIAGNOSIS — R10.13 EPIGASTRIC PAIN: ICD-10-CM

## 2024-10-23 DIAGNOSIS — A04.72 C. DIFFICILE COLITIS: ICD-10-CM

## 2024-10-23 DIAGNOSIS — R10.32 LEFT LOWER QUADRANT ABDOMINAL PAIN: ICD-10-CM

## 2024-10-23 DIAGNOSIS — K57.92 DIVERTICULITIS: ICD-10-CM

## 2024-10-23 DIAGNOSIS — R10.31 RLQ ABDOMINAL PAIN: ICD-10-CM

## 2024-10-23 PROCEDURE — 99214 OFFICE O/P EST MOD 30 MIN: CPT | Performed by: INTERNAL MEDICINE

## 2024-10-23 RX ORDER — ALPRAZOLAM 1 MG/1
TAKE 1 TABLET 4 TIMES DAILY TABLET ORAL
Refills: 0 | Status: ON HOLD | COMMUNITY
Start: 2015-02-02

## 2024-10-23 RX ORDER — GLIMEPIRIDE 1 MG/1
1 TABLET WITH BREAKFAST OR THE FIRST MAIN MEAL OF THE DAY TABLET ORAL ONCE A DAY
Status: ACTIVE | COMMUNITY

## 2024-10-23 RX ORDER — DICYCLOMINE HYDROCHLORIDE 10 MG/1
TAKE 1 CAPSULE BY MOUTH EVERY 4 TO 6 HOURS AS NEEDED FOR ABDOMINAL PAIN CAPSULE ORAL
Qty: 60 | Refills: 2 | Status: ON HOLD | COMMUNITY
Start: 2020-04-13

## 2024-10-23 RX ORDER — FAMOTIDINE 40 MG/1
TAKE 1 TABLET AT BEDTIME TABLET ORAL
Refills: 0 | Status: ON HOLD | COMMUNITY

## 2024-10-23 RX ORDER — POLYETHYLENE GLYCOL 3350 17 G/17G
AS DIRECTED POWDER, FOR SOLUTION ORAL
Status: ON HOLD | COMMUNITY

## 2024-10-23 RX ORDER — TRAMADOL HYDROCHLORIDE 50 MG/1
1 TABLET AS NEEDED TABLET, FILM COATED ORAL
Status: ON HOLD | COMMUNITY

## 2024-10-23 RX ORDER — TOPIRAMATE 100 MG/1
TAKE 1 CAPSULE BEDTIME CAPSULE, EXTENDED RELEASE ORAL
Refills: 0 | Status: ON HOLD | COMMUNITY

## 2024-10-23 RX ORDER — MONTELUKAST SODIUM 10 MG/1
1 TABLET TABLET, FILM COATED ORAL ONCE A DAY
Refills: 0 | Status: ON HOLD | COMMUNITY
Start: 2017-02-13

## 2024-10-23 RX ORDER — ACYCLOVIR 50 MG/G
1 APPLICATION EVERY 3 HOURS OINTMENT TOPICAL
Status: ON HOLD | COMMUNITY

## 2024-10-23 RX ORDER — TRAZODONE HYDROCHLORIDE 150 MG/1
1 TABLET AT BEDTIME TABLET ORAL ONCE A DAY
Status: ON HOLD | COMMUNITY

## 2024-10-23 RX ORDER — EPTINEZUMAB-JJMR 100 MG/ML
AS DIRECTED INJECTION INTRAVENOUS
Status: ON HOLD | COMMUNITY

## 2024-10-23 RX ORDER — DIPHENOXYLATE HYDROCHLORIDE AND ATROPINE SULFATE 2.5; .025 MG/1; MG/1
1 TABLET AS NEEDED TABLET ORAL
Status: ACTIVE | COMMUNITY

## 2024-10-23 RX ORDER — FIDAXOMICIN 200 MG/1
1 TABLET TABLET, FILM COATED ORAL TWICE A DAY
Status: ON HOLD | COMMUNITY

## 2024-10-23 RX ORDER — SUMATRIPTAN SUCCINATE 100 MG/1
1 TABLET AT LEAST 2 HOURS BETWEEN DOSES AS NEEDED TABLET, FILM COATED ORAL
Status: ACTIVE | COMMUNITY

## 2024-10-23 RX ORDER — SUCRALFATE 1 G/1
TAKE ONE TABLET BY MOUTH TWICE DAILY ON AN EMPTY STOMACH TABLET ORAL
Qty: 60 TABLET | Refills: 3 | Status: ACTIVE | COMMUNITY

## 2024-10-23 RX ORDER — SULFAMETHOXAZOLE AND TRIMETHOPRIM 800; 160 MG/1; MG/1
1 TABLET TABLET ORAL TWICE A DAY
Status: ON HOLD | COMMUNITY

## 2024-10-23 RX ORDER — ACETAMINOPHEN 325 MG/1
1 TABLET 500 MG AS NEEDED TABLET, FILM COATED ORAL
Status: ON HOLD | COMMUNITY

## 2024-10-23 RX ORDER — METHENAMINE HIPPURATE 1 G/1
TAKE ONE TABLET BY MOUTH TWICE DAILY TABLET ORAL
Qty: 60 | Refills: 0 | Status: ON HOLD | COMMUNITY
Start: 2019-01-04

## 2024-10-23 RX ORDER — ICOSAPENT ETHYL 500 MG/1
4 CAPSULES WITH MEALS CAPSULE ORAL TWICE A DAY
Status: ON HOLD | COMMUNITY

## 2024-10-23 RX ORDER — COLESEVELAM HYDROCHLORIDE 625 MG/1
1 TABLET TABLET, COATED ORAL TWICE A DAY
Qty: 60 TABLET | Refills: 2 | Status: ACTIVE | COMMUNITY
Start: 2022-08-12

## 2024-10-23 RX ORDER — DICLOFENAC SODIUM TOPICAL GEL, 1% 10 MG/G
AS DIRECTED GEL TOPICAL
Status: ACTIVE | COMMUNITY

## 2024-10-23 RX ORDER — BEZLOTOXUMAB 25 MG/ML
AS DIRECTED INJECTION, SOLUTION INTRAVENOUS
Status: ON HOLD | COMMUNITY

## 2024-10-23 RX ORDER — MECLIZINE HYDROCHLORIDE 12.5 MG/1
TAKE 1-2 TABLETS DAILY AS DIRECTED TABLET ORAL
Refills: 0 | Status: ON HOLD | COMMUNITY

## 2024-10-23 RX ORDER — EVOLOCUMAB 140 MG/ML
INJECTION, SOLUTION SUBCUTANEOUS
Qty: 2 | Refills: 0 | Status: ON HOLD | COMMUNITY
Start: 2020-05-15

## 2024-10-23 RX ORDER — SERTRALINE HYDROCHLORIDE 112 UG/1
TAKE 1 TABLET DAILY TABLET ORAL
Refills: 0 | Status: ON HOLD | COMMUNITY

## 2024-10-23 RX ORDER — AZELASTINE HYDROCHLORIDE AND FLUTICASONE PROPIONATE 137; 50 UG/1; UG/1
1 SPRAY IN EACH NOSTRIL SPRAY, METERED NASAL
Status: ON HOLD | COMMUNITY

## 2024-10-23 RX ORDER — TOPIRAMATE 50 MG/1
1.5 TABLET TABLET, FILM COATED ORAL TWICE A DAY
Status: ACTIVE | COMMUNITY

## 2024-10-23 RX ORDER — ACETAMINOPHEN 500 MG
AS DIRECTED TABLET ORAL
Status: ON HOLD | COMMUNITY

## 2024-10-23 RX ORDER — CIPROFLOXACIN 500 MG/1
1 TABLET TABLET, FILM COATED ORAL
Qty: 28 TABLET | Refills: 1 | Status: ON HOLD | COMMUNITY
Start: 2021-02-22

## 2024-10-23 RX ORDER — NABUMETONE 500 MG/1
1 TABLET TABLET, FILM COATED ORAL TWICE A DAY
Status: ON HOLD | COMMUNITY

## 2024-10-23 RX ORDER — BUDESONIDE AND FORMOTEROL FUMARATE DIHYDRATE 160; 4.5 UG/1; UG/1
INHALE 2 PUFFS TWICE DAILY. RINSE MOUTH AFTER USE AEROSOL RESPIRATORY (INHALATION)
Refills: 0 | Status: ON HOLD | COMMUNITY

## 2024-10-23 RX ORDER — OMEPRAZOLE 40 MG/1
1 CAPSULE WITH EVENING MEAL CAPSULE, DELAYED RELEASE ORAL ONCE A DAY
Qty: 90 | Refills: 4 | Status: ON HOLD | COMMUNITY

## 2024-10-23 RX ORDER — METRONIDAZOLE 500 MG/1
1 TABLET TABLET, FILM COATED ORAL THREE TIMES A DAY
Qty: 42 TABLET | Refills: 1 | Status: ON HOLD | COMMUNITY
Start: 2021-02-22

## 2024-10-23 RX ORDER — INFLUENZA A VIRUS A/VICTORIA/4897/2022 IVR-238 (H1N1) ANTIGEN (FORMALDEHYDE INACTIVATED), INFLUENZA A VIRUS A/DARWIN/9/2021 SAN-010 (H3N2) ANTIGEN (FORMALDEHYDE INACTIVATED), INFLUENZA B VIRUS B/PHUKET/3073/2013 ANTIGEN (FORMALDEHYDE INACTIVATED), AND INFLUENZA B VIRUS B/MICHIGAN/01/2021 ANTIGEN (FORMALDEHYDE INACTIVATED) 60; 60; 60; 60 UG/.7ML; UG/.7ML; UG/.7ML; UG/.7ML
AS DIRECTED INJECTION, SUSPENSION INTRAMUSCULAR
Status: ON HOLD | COMMUNITY

## 2024-10-23 RX ORDER — LEVOTHYROXINE SODIUM 100 UG/1
1 TABLET IN THE MORNING ON AN EMPTY STOMACH TABLET ORAL ONCE A DAY
Status: ACTIVE | COMMUNITY

## 2024-10-23 RX ORDER — TAMSULOSIN HYDROCHLORIDE 0.4 MG/1
1 CAPSULE CAPSULE ORAL ONCE A DAY
Status: ON HOLD | COMMUNITY

## 2024-10-23 RX ORDER — SERTRALINE 100 MG/1
1 TABLET TABLET, FILM COATED ORAL ONCE A DAY
Refills: 0 | Status: ACTIVE | COMMUNITY

## 2024-10-23 RX ORDER — GABAPENTIN 100 MG/1
1 TABLET CAPSULE ORAL TWICE A DAY
Refills: 0 | Status: ON HOLD | COMMUNITY
Start: 2018-08-30

## 2024-10-23 RX ORDER — FEXOFENADINE HCL 180 MG/1
TAKE 1 TABLET EVERY MORNING TABLET ORAL
Qty: 30 | Refills: 0 | Status: ON HOLD | COMMUNITY
Start: 2019-05-10

## 2024-10-23 NOTE — HPI-ZZZTODAY'S VISIT
73-year-old female presenting for follow-up.  She was last seen in June 2024.  She does have a history of intermittent diarrhea and GERD.  She has had recurrent C. difficile infection in the past and was treated with Dificid as well as Zinplava infusion in 2022.  She had recurrent diarrhea after her last visit and was given a prescription of Lomotil after stool studies were negative.  Her bowel movements were mostly at night and she was fairly miserable.  She is presenting today for follow-up.  She has been having significant RLQ abdominal pain. She had COVID August 2024. She went to Urgent care. She was significantly ill and had increased coughing. She is very tearful and difficult to console. She appears to have some depression since her  has passed.

## 2024-10-24 LAB
A/G RATIO: 2.2
ABSOLUTE BASOPHILS: 50
ABSOLUTE EOSINOPHILS: 139
ABSOLUTE LYMPHOCYTES: 2331
ABSOLUTE MONOCYTES: 441
ABSOLUTE NEUTROPHILS: 3339
ALBUMIN: 4.6
ALKALINE PHOSPHATASE: 77
ALT (SGPT): 13
AST (SGOT): 12
BASOPHILS: 0.8
BILIRUBIN, TOTAL: 0.3
BUN/CREATININE RATIO: (no result)
BUN: 21
CALCIUM: 9.3
CARBON DIOXIDE, TOTAL: 18
CHLORIDE: 114
CREATININE: 0.84
EGFR: 74
EOSINOPHILS: 2.2
GLOBULIN, TOTAL: 2.1
GLUCOSE: 101
HEMATOCRIT: 40.1
HEMOGLOBIN: 13
LYMPHOCYTES: 37
MCH: 29
MCHC: 32.4
MCV: 89.5
MONOCYTES: 7
MPV: 10.2
NEUTROPHILS: 53
PLATELET COUNT: 230
POTASSIUM: 4.1
PROTEIN, TOTAL: 6.7
RDW: 15.1
RED BLOOD CELL COUNT: 4.48
SODIUM: 146
WHITE BLOOD CELL COUNT: 6.3

## 2024-10-28 ENCOUNTER — TELEPHONE ENCOUNTER (OUTPATIENT)
Dept: URBAN - METROPOLITAN AREA CLINIC 113 | Facility: CLINIC | Age: 74
End: 2024-10-28

## 2024-10-29 ENCOUNTER — WEB ENCOUNTER (OUTPATIENT)
Dept: URBAN - METROPOLITAN AREA CLINIC 113 | Facility: CLINIC | Age: 74
End: 2024-10-29

## 2025-01-28 ENCOUNTER — OFFICE VISIT (OUTPATIENT)
Dept: URBAN - METROPOLITAN AREA CLINIC 113 | Facility: CLINIC | Age: 75
End: 2025-01-28
Payer: MEDICARE

## 2025-01-28 VITALS
HEIGHT: 59 IN | DIASTOLIC BLOOD PRESSURE: 72 MMHG | BODY MASS INDEX: 29.03 KG/M2 | TEMPERATURE: 98.2 F | RESPIRATION RATE: 18 BRPM | SYSTOLIC BLOOD PRESSURE: 107 MMHG | WEIGHT: 144 LBS | HEART RATE: 96 BPM

## 2025-01-28 DIAGNOSIS — Z87.19 HISTORY OF DIVERTICULITIS: ICD-10-CM

## 2025-01-28 DIAGNOSIS — K57.92 DIVERTICULITIS: ICD-10-CM

## 2025-01-28 DIAGNOSIS — R10.32 LEFT LOWER QUADRANT ABDOMINAL PAIN: ICD-10-CM

## 2025-01-28 DIAGNOSIS — A09 DIARRHEA OF INFECTIOUS ORIGIN: ICD-10-CM

## 2025-01-28 DIAGNOSIS — Z86.19 HISTORY OF CLOSTRIDIOIDES DIFFICILE COLITIS: ICD-10-CM

## 2025-01-28 DIAGNOSIS — R10.13 EPIGASTRIC PAIN: ICD-10-CM

## 2025-01-28 DIAGNOSIS — Z90.49 HISTORY OF HEMICOLECTOMY: ICD-10-CM

## 2025-01-28 DIAGNOSIS — R15.9 FULL INCONTINENCE OF FECES: ICD-10-CM

## 2025-01-28 DIAGNOSIS — D12.6 ADENOMATOUS POLYP OF COLON, UNSPECIFIED PART OF COLON: ICD-10-CM

## 2025-01-28 DIAGNOSIS — K21.9 GASTROESOPHAGEAL REFLUX DISEASE, UNSPECIFIED WHETHER ESOPHAGITIS PRESENT: ICD-10-CM

## 2025-01-28 DIAGNOSIS — A04.72 C. DIFFICILE COLITIS: ICD-10-CM

## 2025-01-28 DIAGNOSIS — R10.31 RLQ ABDOMINAL PAIN: ICD-10-CM

## 2025-01-28 PROCEDURE — 99214 OFFICE O/P EST MOD 30 MIN: CPT | Performed by: INTERNAL MEDICINE

## 2025-01-28 PROCEDURE — 99212 OFFICE O/P EST SF 10 MIN: CPT | Performed by: INTERNAL MEDICINE

## 2025-01-28 RX ORDER — INFLUENZA A VIRUS A/VICTORIA/4897/2022 IVR-238 (H1N1) ANTIGEN (FORMALDEHYDE INACTIVATED), INFLUENZA A VIRUS A/DARWIN/9/2021 SAN-010 (H3N2) ANTIGEN (FORMALDEHYDE INACTIVATED), INFLUENZA B VIRUS B/PHUKET/3073/2013 ANTIGEN (FORMALDEHYDE INACTIVATED), AND INFLUENZA B VIRUS B/MICHIGAN/01/2021 ANTIGEN (FORMALDEHYDE INACTIVATED) 60; 60; 60; 60 UG/.7ML; UG/.7ML; UG/.7ML; UG/.7ML
AS DIRECTED INJECTION, SUSPENSION INTRAMUSCULAR
Status: ON HOLD | COMMUNITY

## 2025-01-28 RX ORDER — EPTINEZUMAB-JJMR 100 MG/ML
AS DIRECTED INJECTION INTRAVENOUS
Status: ON HOLD | COMMUNITY

## 2025-01-28 RX ORDER — BEZLOTOXUMAB 25 MG/ML
AS DIRECTED INJECTION, SOLUTION INTRAVENOUS
Status: ON HOLD | COMMUNITY

## 2025-01-28 RX ORDER — POLYETHYLENE GLYCOL 3350 17 G/DOSE
AS DIRECTED POWDER (GRAM) ORAL
Status: ON HOLD | COMMUNITY

## 2025-01-28 RX ORDER — TAMSULOSIN HYDROCHLORIDE 0.4 MG/1
1 CAPSULE CAPSULE ORAL ONCE A DAY
Status: ON HOLD | COMMUNITY

## 2025-01-28 RX ORDER — ICOSAPENT ETHYL 500 MG/1
4 CAPSULES WITH MEALS CAPSULE ORAL TWICE A DAY
Status: ON HOLD | COMMUNITY

## 2025-01-28 RX ORDER — TRAMADOL HYDROCHLORIDE 50 MG/1
1 TABLET AS NEEDED TABLET, FILM COATED ORAL
Status: ON HOLD | COMMUNITY

## 2025-01-28 RX ORDER — FEXOFENADINE HCL 180 MG/1
TAKE 1 TABLET EVERY MORNING TABLET ORAL
Qty: 30 | Refills: 0 | Status: ON HOLD | COMMUNITY
Start: 2019-05-10

## 2025-01-28 RX ORDER — OMEPRAZOLE 40 MG/1
1 CAPSULE WITH EVENING MEAL CAPSULE, DELAYED RELEASE ORAL ONCE A DAY
Qty: 90 | Refills: 4 | Status: ON HOLD | COMMUNITY

## 2025-01-28 RX ORDER — METHENAMINE HIPPURATE 1 G/1
TAKE ONE TABLET BY MOUTH TWICE DAILY TABLET ORAL
Qty: 60 | Refills: 0 | Status: ON HOLD | COMMUNITY
Start: 2019-01-04

## 2025-01-28 RX ORDER — ALPRAZOLAM 1 MG/1
TAKE 1 TABLET 4 TIMES DAILY TABLET ORAL
Refills: 0 | Status: ON HOLD | COMMUNITY
Start: 2015-02-02

## 2025-01-28 RX ORDER — SUMATRIPTAN SUCCINATE 100 MG/1
1 TABLET AT LEAST 2 HOURS BETWEEN DOSES AS NEEDED TABLET, FILM COATED ORAL
Status: ACTIVE | COMMUNITY

## 2025-01-28 RX ORDER — DICLOFENAC SODIUM TOPICAL GEL, 1% 10 MG/G
AS DIRECTED GEL TOPICAL
Status: ACTIVE | COMMUNITY

## 2025-01-28 RX ORDER — SULFAMETHOXAZOLE AND TRIMETHOPRIM 800; 160 MG/1; MG/1
1 TABLET TABLET ORAL TWICE A DAY
Status: ON HOLD | COMMUNITY

## 2025-01-28 RX ORDER — TOPIRAMATE 100 MG/1
TAKE 1 CAPSULE BEDTIME CAPSULE, EXTENDED RELEASE ORAL
Refills: 0 | Status: ON HOLD | COMMUNITY

## 2025-01-28 RX ORDER — METRONIDAZOLE 500 MG/1
1 TABLET TABLET, FILM COATED ORAL THREE TIMES A DAY
Qty: 42 TABLET | Refills: 1 | Status: ON HOLD | COMMUNITY
Start: 2021-02-22

## 2025-01-28 RX ORDER — SERTRALINE 100 MG/1
1 TABLET TABLET, FILM COATED ORAL ONCE A DAY
Refills: 0 | Status: ACTIVE | COMMUNITY

## 2025-01-28 RX ORDER — BUDESONIDE AND FORMOTEROL FUMARATE DIHYDRATE 160; 4.5 UG/1; UG/1
INHALE 2 PUFFS TWICE DAILY. RINSE MOUTH AFTER USE AEROSOL RESPIRATORY (INHALATION)
Refills: 0 | Status: ON HOLD | COMMUNITY

## 2025-01-28 RX ORDER — ACYCLOVIR 50 MG/G
1 APPLICATION EVERY 3 HOURS OINTMENT TOPICAL
Status: ON HOLD | COMMUNITY

## 2025-01-28 RX ORDER — MONTELUKAST SODIUM 10 MG/1
1 TABLET TABLET, FILM COATED ORAL ONCE A DAY
Refills: 0 | Status: ON HOLD | COMMUNITY
Start: 2017-02-13

## 2025-01-28 RX ORDER — MECLIZINE HYDROCHLORIDE 12.5 MG/1
TAKE 1-2 TABLETS DAILY AS DIRECTED TABLET ORAL
Refills: 0 | Status: ON HOLD | COMMUNITY

## 2025-01-28 RX ORDER — LEVOTHYROXINE SODIUM 100 UG/1
1 TABLET IN THE MORNING ON AN EMPTY STOMACH TABLET ORAL ONCE A DAY
Status: ACTIVE | COMMUNITY

## 2025-01-28 RX ORDER — COLESEVELAM HYDROCHLORIDE 625 MG/1
1 TABLET TABLET, COATED ORAL TWICE A DAY
Qty: 60 TABLET | Refills: 2 | Status: ACTIVE | COMMUNITY
Start: 2022-08-12

## 2025-01-28 RX ORDER — TOPIRAMATE 50 MG/1
1.5 TABLET TABLET, FILM COATED ORAL TWICE A DAY
Status: ACTIVE | COMMUNITY

## 2025-01-28 RX ORDER — NABUMETONE 500 MG/1
1 TABLET TABLET, FILM COATED ORAL TWICE A DAY
Status: ON HOLD | COMMUNITY

## 2025-01-28 RX ORDER — ACETAMINOPHEN 325 MG/1
1 TABLET 500 MG AS NEEDED TABLET, FILM COATED ORAL
Status: ON HOLD | COMMUNITY

## 2025-01-28 RX ORDER — DIPHENOXYLATE HYDROCHLORIDE AND ATROPINE SULFATE 2.5; .025 MG/1; MG/1
1 TABLET AS NEEDED TABLET ORAL
Status: ACTIVE | COMMUNITY

## 2025-01-28 RX ORDER — GABAPENTIN 100 MG/1
1 TABLET CAPSULE ORAL TWICE A DAY
Refills: 0 | Status: ON HOLD | COMMUNITY
Start: 2018-08-30

## 2025-01-28 RX ORDER — FAMOTIDINE 40 MG/1
TAKE 1 TABLET AT BEDTIME TABLET ORAL
Refills: 0 | Status: ON HOLD | COMMUNITY

## 2025-01-28 RX ORDER — SUCRALFATE 1 G/1
TAKE ONE TABLET BY MOUTH TWICE DAILY ON AN EMPTY STOMACH TABLET ORAL
Qty: 60 TABLET | Refills: 3 | Status: ACTIVE | COMMUNITY

## 2025-01-28 RX ORDER — AZELASTINE HYDROCHLORIDE AND FLUTICASONE PROPIONATE 137; 50 UG/1; UG/1
1 SPRAY IN EACH NOSTRIL SPRAY, METERED NASAL
Status: ON HOLD | COMMUNITY

## 2025-01-28 RX ORDER — GLIMEPIRIDE 1 MG/1
1 TABLET WITH BREAKFAST OR THE FIRST MAIN MEAL OF THE DAY TABLET ORAL ONCE A DAY
Status: ACTIVE | COMMUNITY

## 2025-01-28 RX ORDER — EVOLOCUMAB 140 MG/ML
INJECTION, SOLUTION SUBCUTANEOUS
Qty: 2 | Refills: 0 | Status: ON HOLD | COMMUNITY
Start: 2020-05-15

## 2025-01-28 RX ORDER — TRAZODONE HYDROCHLORIDE 150 MG/1
1 TABLET AT BEDTIME TABLET ORAL ONCE A DAY
Status: ON HOLD | COMMUNITY

## 2025-01-28 RX ORDER — DICYCLOMINE HYDROCHLORIDE 10 MG/1
TAKE 1 CAPSULE BY MOUTH EVERY 4 TO 6 HOURS AS NEEDED FOR ABDOMINAL PAIN CAPSULE ORAL
Qty: 60 | Refills: 2 | Status: ON HOLD | COMMUNITY
Start: 2020-04-13

## 2025-01-28 RX ORDER — ACETAMINOPHEN 500 MG
AS DIRECTED TABLET ORAL
Status: ON HOLD | COMMUNITY

## 2025-01-28 RX ORDER — FIDAXOMICIN 200 MG/1
1 TABLET TABLET, FILM COATED ORAL TWICE A DAY
Status: ON HOLD | COMMUNITY

## 2025-01-28 RX ORDER — CIPROFLOXACIN 500 MG/1
1 TABLET TABLET, FILM COATED ORAL
Qty: 28 TABLET | Refills: 1 | Status: ON HOLD | COMMUNITY
Start: 2021-02-22

## 2025-01-28 RX ORDER — SERTRALINE HYDROCHLORIDE 112 UG/1
TAKE 1 TABLET DAILY TABLET ORAL
Refills: 0 | Status: ON HOLD | COMMUNITY

## 2025-01-28 NOTE — HPI-ZZZTODAY'S VISIT
74-year-old female presenting for follow-up.  She was last seen in October 2024.  Recall she does have several GI issues in the past.  Most recently she has been having intermittent right lower quadrant abdominal discomfort.  She did have a CT scan abdomen pelvis performed as well as basic labs.  Her CT scan was done on October 24, 2024.  She was found to have bilateral fat-containing inguinal hernias without evidence of bowel involvement.  There was otherwise no acute pathology.  She is here today for follow-up.  She does have a hx of colon polpys. She is due for a colonoscopy this year. Will schedule.   She did have hernia repair.

## 2025-02-18 ENCOUNTER — TELEPHONE ENCOUNTER (OUTPATIENT)
Dept: URBAN - METROPOLITAN AREA CLINIC 5 | Facility: CLINIC | Age: 75
End: 2025-02-18

## 2025-03-06 ENCOUNTER — TELEPHONE ENCOUNTER (OUTPATIENT)
Dept: URBAN - METROPOLITAN AREA CLINIC 113 | Facility: CLINIC | Age: 75
End: 2025-03-06

## 2025-03-10 ENCOUNTER — OFFICE VISIT (OUTPATIENT)
Dept: URBAN - METROPOLITAN AREA SURGERY CENTER 25 | Facility: SURGERY CENTER | Age: 75
End: 2025-03-10
Payer: COMMERCIAL

## 2025-03-10 ENCOUNTER — CLAIMS CREATED FROM THE CLAIM WINDOW (OUTPATIENT)
Dept: URBAN - METROPOLITAN AREA CLINIC 4 | Facility: CLINIC | Age: 75
End: 2025-03-10
Payer: COMMERCIAL

## 2025-03-10 DIAGNOSIS — D12.4 ADENOMA OF DESCENDING COLON: ICD-10-CM

## 2025-03-10 DIAGNOSIS — D12.4 BENIGN NEOPLASM OF DESCENDING COLON: ICD-10-CM

## 2025-03-10 DIAGNOSIS — Z09 ENCNTR FOR F/U EXAM AFT TRTMT FOR COND OTH THAN MALIG NEOPLM: ICD-10-CM

## 2025-03-10 DIAGNOSIS — Z86.0100 PERSONAL HISTORY OF COLON POLYPS, UNSPECIFIED: ICD-10-CM

## 2025-03-10 DIAGNOSIS — Z86.0100 PERSONAL HISTORY OF COLONIC POLYPS: ICD-10-CM

## 2025-03-10 DIAGNOSIS — D12.4 ADENOMATOUS POLYP OF DESCENDING COLON: ICD-10-CM

## 2025-03-10 DIAGNOSIS — Z98.0 INTESTINAL BYPASS AND ANASTOMOSIS STATUS: ICD-10-CM

## 2025-03-10 DIAGNOSIS — K57.30 COLON, DIVERTICULOSIS: ICD-10-CM

## 2025-03-10 PROCEDURE — 00811 ANES LWR INTST NDSC NOS: CPT | Performed by: ANESTHESIOLOGY

## 2025-03-10 PROCEDURE — 0529F INTRVL 3/>YR PTS CLNSCP DOCD: CPT | Performed by: INTERNAL MEDICINE

## 2025-03-10 PROCEDURE — 45385 COLONOSCOPY W/LESION REMOVAL: CPT | Performed by: INTERNAL MEDICINE

## 2025-03-10 PROCEDURE — 88305 TISSUE EXAM BY PATHOLOGIST: CPT | Performed by: PATHOLOGY

## 2025-03-10 PROCEDURE — 00811 ANES LWR INTST NDSC NOS: CPT | Performed by: NURSE ANESTHETIST, CERTIFIED REGISTERED

## 2025-03-27 ENCOUNTER — OFFICE VISIT (OUTPATIENT)
Dept: URBAN - METROPOLITAN AREA CLINIC 107 | Facility: CLINIC | Age: 75
End: 2025-03-27

## 2025-03-27 ENCOUNTER — TELEPHONE ENCOUNTER (OUTPATIENT)
Dept: URBAN - METROPOLITAN AREA CLINIC 113 | Facility: CLINIC | Age: 75
End: 2025-03-27

## 2025-03-27 RX ORDER — GLIMEPIRIDE 1 MG/1
1 TABLET WITH BREAKFAST OR THE FIRST MAIN MEAL OF THE DAY TABLET ORAL ONCE A DAY
Status: ACTIVE | COMMUNITY

## 2025-03-27 RX ORDER — EPTINEZUMAB-JJMR 100 MG/ML
AS DIRECTED INJECTION INTRAVENOUS
Status: ON HOLD | COMMUNITY

## 2025-03-27 RX ORDER — FAMOTIDINE 40 MG/1
TAKE 1 TABLET AT BEDTIME TABLET ORAL
Refills: 0 | Status: ON HOLD | COMMUNITY

## 2025-03-27 RX ORDER — ACETAMINOPHEN 500 MG
AS DIRECTED TABLET ORAL
Status: ON HOLD | COMMUNITY

## 2025-03-27 RX ORDER — LEVOTHYROXINE SODIUM 100 UG/1
1 TABLET IN THE MORNING ON AN EMPTY STOMACH TABLET ORAL ONCE A DAY
Status: ACTIVE | COMMUNITY

## 2025-03-27 RX ORDER — EVOLOCUMAB 140 MG/ML
INJECTION, SOLUTION SUBCUTANEOUS
Qty: 2 | Refills: 0 | Status: ON HOLD | COMMUNITY
Start: 2020-05-15

## 2025-03-27 RX ORDER — FIDAXOMICIN 200 MG/1
1 TABLET TABLET, FILM COATED ORAL TWICE A DAY
Status: ON HOLD | COMMUNITY

## 2025-03-27 RX ORDER — SUMATRIPTAN SUCCINATE 100 MG/1
1 TABLET AT LEAST 2 HOURS BETWEEN DOSES AS NEEDED TABLET, FILM COATED ORAL
Status: ACTIVE | COMMUNITY

## 2025-03-27 RX ORDER — TOPIRAMATE 100 MG/1
TAKE 1 CAPSULE BEDTIME CAPSULE, EXTENDED RELEASE ORAL
Refills: 0 | Status: ON HOLD | COMMUNITY

## 2025-03-27 RX ORDER — NABUMETONE 500 MG/1
1 TABLET TABLET, FILM COATED ORAL TWICE A DAY
Status: ON HOLD | COMMUNITY

## 2025-03-27 RX ORDER — GABAPENTIN 100 MG/1
1 TABLET CAPSULE ORAL TWICE A DAY
Refills: 0 | Status: ON HOLD | COMMUNITY
Start: 2018-08-30

## 2025-03-27 RX ORDER — MONTELUKAST SODIUM 10 MG/1
1 TABLET TABLET, FILM COATED ORAL ONCE A DAY
Refills: 0 | Status: ON HOLD | COMMUNITY
Start: 2017-02-13

## 2025-03-27 RX ORDER — SERTRALINE HYDROCHLORIDE 112 UG/1
TAKE 1 TABLET DAILY TABLET ORAL
Refills: 0 | Status: ON HOLD | COMMUNITY

## 2025-03-27 RX ORDER — SULFAMETHOXAZOLE AND TRIMETHOPRIM 800; 160 MG/1; MG/1
1 TABLET TABLET ORAL TWICE A DAY
Status: ON HOLD | COMMUNITY

## 2025-03-27 RX ORDER — DIPHENOXYLATE HYDROCHLORIDE AND ATROPINE SULFATE 2.5; .025 MG/1; MG/1
1 TABLET AS NEEDED TABLET ORAL
Status: ACTIVE | COMMUNITY

## 2025-03-27 RX ORDER — DICLOFENAC SODIUM TOPICAL GEL, 1% 10 MG/G
AS DIRECTED GEL TOPICAL
Status: ACTIVE | COMMUNITY

## 2025-03-27 RX ORDER — CIPROFLOXACIN 500 MG/1
1 TABLET TABLET, FILM COATED ORAL
Qty: 28 TABLET | Refills: 1 | Status: ON HOLD | COMMUNITY
Start: 2021-02-22

## 2025-03-27 RX ORDER — METRONIDAZOLE 500 MG/1
1 TABLET TABLET, FILM COATED ORAL THREE TIMES A DAY
Qty: 42 TABLET | Refills: 1 | Status: ON HOLD | COMMUNITY
Start: 2021-02-22

## 2025-03-27 RX ORDER — ALPRAZOLAM 1 MG/1
TAKE 1 TABLET 4 TIMES DAILY TABLET ORAL
Refills: 0 | Status: ON HOLD | COMMUNITY
Start: 2015-02-02

## 2025-03-27 RX ORDER — SUCRALFATE 1 G/1
TAKE ONE TABLET BY MOUTH TWICE DAILY ON AN EMPTY STOMACH TABLET ORAL
Qty: 60 TABLET | Refills: 3 | Status: ACTIVE | COMMUNITY

## 2025-03-27 RX ORDER — SERTRALINE 100 MG/1
1 TABLET TABLET, FILM COATED ORAL ONCE A DAY
Refills: 0 | Status: ACTIVE | COMMUNITY

## 2025-03-27 RX ORDER — ACYCLOVIR 50 MG/G
1 APPLICATION EVERY 3 HOURS OINTMENT TOPICAL
Status: ON HOLD | COMMUNITY

## 2025-03-27 RX ORDER — BUDESONIDE AND FORMOTEROL FUMARATE DIHYDRATE 160; 4.5 UG/1; UG/1
INHALE 2 PUFFS TWICE DAILY. RINSE MOUTH AFTER USE AEROSOL RESPIRATORY (INHALATION)
Refills: 0 | Status: ON HOLD | COMMUNITY

## 2025-03-27 RX ORDER — ICOSAPENT ETHYL 500 MG/1
4 CAPSULES WITH MEALS CAPSULE ORAL TWICE A DAY
Status: ON HOLD | COMMUNITY

## 2025-03-27 RX ORDER — POLYETHYLENE GLYCOL 3350 17 G/DOSE
AS DIRECTED POWDER (GRAM) ORAL
Status: ON HOLD | COMMUNITY

## 2025-03-27 RX ORDER — DICYCLOMINE HYDROCHLORIDE 10 MG/1
TAKE 1 CAPSULE BY MOUTH EVERY 4 TO 6 HOURS AS NEEDED FOR ABDOMINAL PAIN CAPSULE ORAL
Qty: 60 | Refills: 2 | Status: ON HOLD | COMMUNITY
Start: 2020-04-13

## 2025-03-27 RX ORDER — BEZLOTOXUMAB 25 MG/ML
AS DIRECTED INJECTION, SOLUTION INTRAVENOUS
Status: ON HOLD | COMMUNITY

## 2025-03-27 RX ORDER — TRAZODONE HYDROCHLORIDE 150 MG/1
1 TABLET AT BEDTIME TABLET ORAL ONCE A DAY
Status: ON HOLD | COMMUNITY

## 2025-03-27 RX ORDER — DIPHENOXYLATE HYDROCHLORIDE AND ATROPINE SULFATE 2.5; .025 MG/1; MG/1
1 TABLET AS NEEDED TABLET ORAL
Qty: 180 TABLET | Refills: 3

## 2025-03-27 RX ORDER — AZELASTINE HYDROCHLORIDE AND FLUTICASONE PROPIONATE 137; 50 UG/1; UG/1
1 SPRAY IN EACH NOSTRIL SPRAY, METERED NASAL
Status: ON HOLD | COMMUNITY

## 2025-03-27 RX ORDER — OMEPRAZOLE 40 MG/1
1 CAPSULE WITH EVENING MEAL CAPSULE, DELAYED RELEASE ORAL ONCE A DAY
Qty: 90 | Refills: 4 | Status: ON HOLD | COMMUNITY

## 2025-03-27 RX ORDER — TAMSULOSIN HYDROCHLORIDE 0.4 MG/1
1 CAPSULE CAPSULE ORAL ONCE A DAY
Status: ON HOLD | COMMUNITY

## 2025-03-27 RX ORDER — METHENAMINE HIPPURATE 1 G/1
TAKE ONE TABLET BY MOUTH TWICE DAILY TABLET ORAL
Qty: 60 | Refills: 0 | Status: ON HOLD | COMMUNITY
Start: 2019-01-04

## 2025-03-27 RX ORDER — COLESEVELAM HYDROCHLORIDE 625 MG/1
1 TABLET TABLET, COATED ORAL TWICE A DAY
Qty: 60 TABLET | Refills: 2 | Status: ACTIVE | COMMUNITY
Start: 2022-08-12

## 2025-03-27 RX ORDER — INFLUENZA A VIRUS A/VICTORIA/4897/2022 IVR-238 (H1N1) ANTIGEN (FORMALDEHYDE INACTIVATED), INFLUENZA A VIRUS A/DARWIN/9/2021 SAN-010 (H3N2) ANTIGEN (FORMALDEHYDE INACTIVATED), INFLUENZA B VIRUS B/PHUKET/3073/2013 ANTIGEN (FORMALDEHYDE INACTIVATED), AND INFLUENZA B VIRUS B/MICHIGAN/01/2021 ANTIGEN (FORMALDEHYDE INACTIVATED) 60; 60; 60; 60 UG/.7ML; UG/.7ML; UG/.7ML; UG/.7ML
AS DIRECTED INJECTION, SUSPENSION INTRAMUSCULAR
Status: ON HOLD | COMMUNITY

## 2025-03-27 RX ORDER — ACETAMINOPHEN 325 MG/1
1 TABLET 500 MG AS NEEDED TABLET, FILM COATED ORAL
Status: ON HOLD | COMMUNITY

## 2025-03-27 RX ORDER — FEXOFENADINE HCL 180 MG/1
TAKE 1 TABLET EVERY MORNING TABLET ORAL
Qty: 30 | Refills: 0 | Status: ON HOLD | COMMUNITY
Start: 2019-05-10

## 2025-03-27 RX ORDER — TOPIRAMATE 50 MG/1
1.5 TABLET TABLET, FILM COATED ORAL TWICE A DAY
Status: ACTIVE | COMMUNITY

## 2025-03-27 RX ORDER — TRAMADOL HYDROCHLORIDE 50 MG/1
1 TABLET AS NEEDED TABLET, FILM COATED ORAL
Status: ON HOLD | COMMUNITY

## 2025-03-27 RX ORDER — MECLIZINE HYDROCHLORIDE 12.5 MG/1
TAKE 1-2 TABLETS DAILY AS DIRECTED TABLET ORAL
Refills: 0 | Status: ON HOLD | COMMUNITY